# Patient Record
Sex: FEMALE | Race: WHITE | NOT HISPANIC OR LATINO | Employment: PART TIME | URBAN - METROPOLITAN AREA
[De-identification: names, ages, dates, MRNs, and addresses within clinical notes are randomized per-mention and may not be internally consistent; named-entity substitution may affect disease eponyms.]

---

## 2019-07-31 ENCOUNTER — TELEPHONE (OUTPATIENT)
Dept: FAMILY MEDICINE CLINIC | Facility: CLINIC | Age: 24
End: 2019-07-31

## 2019-08-01 DIAGNOSIS — F90.9 ATTENTION DEFICIT HYPERACTIVITY DISORDER (ADHD), UNSPECIFIED ADHD TYPE: Primary | ICD-10-CM

## 2019-08-01 PROBLEM — F90.0 ADHD (ATTENTION DEFICIT HYPERACTIVITY DISORDER), INATTENTIVE TYPE: Status: ACTIVE | Noted: 2019-08-01

## 2019-08-25 PROBLEM — Z79.899 HIGH RISK MEDICATION USE: Status: ACTIVE | Noted: 2019-08-25

## 2019-08-25 PROBLEM — F32.A DEPRESSION: Status: ACTIVE | Noted: 2019-08-25

## 2019-08-27 PROBLEM — J30.1 ACUTE SEASONAL ALLERGIC RHINITIS DUE TO POLLEN: Status: ACTIVE | Noted: 2019-08-27

## 2019-08-27 PROBLEM — D50.9 IRON DEFICIENCY ANEMIA: Status: ACTIVE | Noted: 2019-08-27

## 2019-08-27 PROBLEM — B02.9 HERPES ZOSTER: Status: ACTIVE | Noted: 2019-08-27

## 2019-08-27 PROBLEM — F41.9 ANXIETY: Status: ACTIVE | Noted: 2019-08-27

## 2019-08-30 ENCOUNTER — OFFICE VISIT (OUTPATIENT)
Dept: FAMILY MEDICINE CLINIC | Facility: CLINIC | Age: 24
End: 2019-08-30

## 2019-08-30 VITALS
SYSTOLIC BLOOD PRESSURE: 126 MMHG | WEIGHT: 180 LBS | DIASTOLIC BLOOD PRESSURE: 72 MMHG | OXYGEN SATURATION: 98 % | HEART RATE: 81 BPM | BODY MASS INDEX: 26.66 KG/M2 | TEMPERATURE: 98.7 F | HEIGHT: 69 IN

## 2019-08-30 DIAGNOSIS — F90.9 ATTENTION DEFICIT HYPERACTIVITY DISORDER (ADHD), UNSPECIFIED ADHD TYPE: ICD-10-CM

## 2019-08-30 DIAGNOSIS — Z79.899 HIGH RISK MEDICATION USE: ICD-10-CM

## 2019-08-30 DIAGNOSIS — F90.0 ADHD (ATTENTION DEFICIT HYPERACTIVITY DISORDER), INATTENTIVE TYPE: Primary | ICD-10-CM

## 2019-08-30 PROBLEM — K59.09 OTHER CONSTIPATION: Status: ACTIVE | Noted: 2019-08-30

## 2019-08-30 PROCEDURE — 99213 OFFICE O/P EST LOW 20 MIN: CPT | Performed by: FAMILY MEDICINE

## 2019-08-30 NOTE — PROGRESS NOTES
Chief Complaint   Patient presents with   • Med Refill     Pt is here to refill her ADHD medication    • Constipation     C/o constipation and painful BM's        Subjective   Nati Morin is a 24 y.o. female.     History of Present Illness   PT is here to get reestablished and needs refills and   Patient is here for ADHD checkup needing refills has no chest pains, no palpitations no change in appetite.  And patient is staying focused with medication.  Constipation but laxative and flax seed oil doesn't work well for her.      The following portions of the patient's history were reviewed and updated as appropriate: allergies, current medications, past family history, past medical history, past social history, past surgical history and problem list.    Review of Systems    Patient Active Problem List   Diagnosis   • ADHD (attention deficit hyperactivity disorder), inattentive type   • High risk medication use   • Depression   • Herpes zoster   • Anxiety   • Iron deficiency anemia   • Acute seasonal allergic rhinitis due to pollen       No Known Allergies      Current Outpatient Medications:   •  lisdexamfetamine (VYVANSE) 70 MG capsule, Take 1 capsule by mouth Every Morning, Disp: 30 capsule, Rfl: 0  •  lisdexamfetamine (VYVANSE) 70 MG capsule, Take 70 mg by mouth Daily, Disp: , Rfl:     Past Medical History:   Diagnosis Date   • ADHD (attention deficit hyperactivity disorder), combined type    • Allergic rhinitis    • Anemia    • Anxiety    • Constipation    • Depression    • Hematuria    • Herpes zoster    • High risk medication use    • Menstrual period late    • Situational anxiety        History reviewed. No pertinent surgical history.    Family History   Problem Relation Age of Onset   • Depression Mother    • Alcohol abuse Father        Social History     Tobacco Use   • Smoking status: Never Smoker   • Smokeless tobacco: Never Used   Substance Use Topics   • Alcohol use: Yes     Comment: social use     "        Objective     Visit Vitals  /82   Pulse 81   Temp 98.7 °F (37.1 °C)   Ht 175.3 cm (69\")   Wt 81.6 kg (180 lb)   SpO2 98%   BMI 26.58 kg/m²       Physical Exam    Lab Results   Component Value Date    GLUCOSE 127 (H) 08/21/2016    BUN 13 08/21/2016    CREATININE 0.73 08/21/2016    EGFRIFNONA 100 08/21/2016    BCR 17.8 08/21/2016    K 4.0 08/21/2016    CO2 19.1 (L) 08/21/2016    CALCIUM 9.2 08/21/2016    ALBUMIN 4.90 08/21/2016    AST 18 08/21/2016    ALT 14 08/21/2016       WBC   Date Value Ref Range Status   08/21/2016 5.63 4.50 - 10.70 10*3/mm3 Final     RBC   Date Value Ref Range Status   08/21/2016 3.97 3.90 - 5.20 10*6/mm3 Final     Hemoglobin   Date Value Ref Range Status   08/21/2016 12.1 11.9 - 15.5 g/dL Final     Hematocrit   Date Value Ref Range Status   08/21/2016 36.0 35.6 - 45.5 % Final     MCV   Date Value Ref Range Status   08/21/2016 90.7 80.5 - 98.2 fL Final     MCH   Date Value Ref Range Status   08/21/2016 30.5 26.9 - 32.7 pg Final     MCHC   Date Value Ref Range Status   08/21/2016 33.6 32.4 - 36.3 g/dL Final     RDW   Date Value Ref Range Status   08/21/2016 12.9 11.7 - 13.0 % Final     RDW-SD   Date Value Ref Range Status   08/21/2016 42.9 37.0 - 54.0 fl Final     MPV   Date Value Ref Range Status   08/21/2016 10.9 6.0 - 12.0 fL Final     Platelets   Date Value Ref Range Status   08/21/2016 198 140 - 500 10*3/mm3 Final     Neutrophil %   Date Value Ref Range Status   08/21/2016 54.4 42.7 - 76.0 % Final     Lymphocyte %   Date Value Ref Range Status   08/21/2016 38.0 19.6 - 45.3 % Final     Monocyte %   Date Value Ref Range Status   08/21/2016 5.2 5.0 - 12.0 % Final     Eosinophil %   Date Value Ref Range Status   08/21/2016 1.6 0.3 - 6.2 % Final     Basophil %   Date Value Ref Range Status   08/21/2016 0.4 0.0 - 1.5 % Final     Immature Grans %   Date Value Ref Range Status   08/21/2016 0.4 0.0 - 0.5 % Final     Neutrophils, Absolute   Date Value Ref Range Status   08/21/2016 3.07 " 1.90 - 8.10 10*3/mm3 Final     Lymphocytes, Absolute   Date Value Ref Range Status   08/21/2016 2.14 0.90 - 4.80 10*3/mm3 Final     Monocytes, Absolute   Date Value Ref Range Status   08/21/2016 0.29 0.20 - 1.20 10*3/mm3 Final     Eosinophils, Absolute   Date Value Ref Range Status   08/21/2016 0.09 0.00 - 0.70 10*3/mm3 Final     Basophils, Absolute   Date Value Ref Range Status   08/21/2016 0.02 0.00 - 0.20 10*3/mm3 Final     Immature Grans, Absolute   Date Value Ref Range Status   08/21/2016 0.02 0.00 - 0.03 10*3/mm3 Final       No results found for: HGBA1C    No results found for: NPIORBSV20    No results found for: TSH    No results found for: CHOL  No results found for: TRIG  No results found for: HDL  No results found for: LDL  No results found for: VLDL  No results found for: LDLHDL      Procedures    Assessment/Plan   Problems Addressed this Visit        Unprioritized    ADHD (attention deficit hyperactivity disorder), inattentive type - Primary    Relevant Medications    lisdexamfetamine (VYVANSE) 70 MG capsule    High risk medication use    Relevant Orders    Compliance Drug Analysis, Ur - Urine, Clean Catch          Orders Placed This Encounter   Procedures   • Compliance Drug Analysis, Ur - Urine, Clean Catch       Current Outpatient Medications   Medication Sig Dispense Refill   • lisdexamfetamine (VYVANSE) 70 MG capsule Take 1 capsule by mouth Every Morning 30 capsule 0   • lisdexamfetamine (VYVANSE) 70 MG capsule Take 70 mg by mouth Daily       No current facility-administered medications for this visit.        SHI RUN  and reviewed.  Risks of the medication include but are not limited to fatigue, somnolence, increased risk of falls, constipation, allergic reaction, dependence, and addiction.  Also, emphasized not taking any illicit substances.  Patient is aware and acknowledges information given.      Drug screen done today and Controlled Substance agreement signed.  Refills today.  No Follow-up  on file.  linzess 72 mg samples given  There are no Patient Instructions on file for this visit.

## 2019-09-05 LAB — DRUGS UR: NORMAL

## 2019-09-11 ENCOUNTER — TELEPHONE (OUTPATIENT)
Dept: FAMILY MEDICINE CLINIC | Facility: CLINIC | Age: 24
End: 2019-09-11

## 2019-09-11 NOTE — TELEPHONE ENCOUNTER
Patient says the samples of stomach medicine she got last week helped her. She doesn't know what the name is. Wants it called in to [facility]

## 2019-09-12 DIAGNOSIS — K59.09 OTHER CONSTIPATION: Primary | ICD-10-CM

## 2019-10-01 ENCOUNTER — TELEPHONE (OUTPATIENT)
Dept: FAMILY MEDICINE CLINIC | Facility: CLINIC | Age: 24
End: 2019-10-01

## 2019-10-03 DIAGNOSIS — F90.9 ATTENTION DEFICIT HYPERACTIVITY DISORDER (ADHD), UNSPECIFIED ADHD TYPE: ICD-10-CM

## 2019-10-30 ENCOUNTER — TELEPHONE (OUTPATIENT)
Dept: FAMILY MEDICINE CLINIC | Facility: CLINIC | Age: 24
End: 2019-10-30

## 2019-10-31 DIAGNOSIS — F90.9 ATTENTION DEFICIT HYPERACTIVITY DISORDER (ADHD), UNSPECIFIED ADHD TYPE: ICD-10-CM

## 2019-11-25 ENCOUNTER — TELEPHONE (OUTPATIENT)
Dept: FAMILY MEDICINE CLINIC | Facility: CLINIC | Age: 24
End: 2019-11-25

## 2019-11-25 DIAGNOSIS — F90.9 ATTENTION DEFICIT HYPERACTIVITY DISORDER (ADHD), UNSPECIFIED ADHD TYPE: ICD-10-CM

## 2019-11-27 NOTE — TELEPHONE ENCOUNTER
Patient called about prescription.  Advised it had been sent in on the 25th.  States she will call the pharmacy.

## 2020-01-10 ENCOUNTER — OFFICE VISIT (OUTPATIENT)
Dept: FAMILY MEDICINE CLINIC | Facility: CLINIC | Age: 25
End: 2020-01-10

## 2020-01-10 VITALS
HEIGHT: 69 IN | WEIGHT: 170.8 LBS | DIASTOLIC BLOOD PRESSURE: 64 MMHG | BODY MASS INDEX: 25.3 KG/M2 | OXYGEN SATURATION: 98 % | SYSTOLIC BLOOD PRESSURE: 112 MMHG | TEMPERATURE: 98.2 F | HEART RATE: 88 BPM

## 2020-01-10 DIAGNOSIS — F90.9 ATTENTION DEFICIT HYPERACTIVITY DISORDER (ADHD), UNSPECIFIED ADHD TYPE: ICD-10-CM

## 2020-01-10 DIAGNOSIS — K59.09 OTHER CONSTIPATION: ICD-10-CM

## 2020-01-10 PROCEDURE — 99213 OFFICE O/P EST LOW 20 MIN: CPT | Performed by: FAMILY MEDICINE

## 2020-01-10 NOTE — PROGRESS NOTES
Chief Complaint   Patient presents with   • Med Refill     Pt is here for refills on ADHD meds       Subjective   Nati Morin is a 24 y.o. female.     History of Present Illness   Pt is here for refills and  Patient is here for ADHD checkup needing refills has no chest pains, no palpitations no change in appetite.  And patient is staying focused with medication.  Constipation- stable with meds.  Her insurance has lapsed so will get new insurance in March.    I filled out paperwork for her to get meds discounted or free.      The following portions of the patient's history were reviewed and updated as appropriate: allergies, current medications, past family history, past medical history, past social history, past surgical history and problem list.    Review of Systems   Constitutional: Negative for fatigue.   Eyes: Negative for blurred vision.   Respiratory: Negative for cough, chest tightness and shortness of breath.    Cardiovascular: Negative for chest pain, palpitations and leg swelling.   Neurological: Negative for dizziness, light-headedness and headache.       Patient Active Problem List   Diagnosis   • ADHD (attention deficit hyperactivity disorder), inattentive type   • High risk medication use   • Herpes zoster   • Iron deficiency anemia   • Acute seasonal allergic rhinitis due to pollen   • Other constipation   • Anxiety and depression       No Known Allergies      Current Outpatient Medications:   •  linaclotide (LINZESS) 72 MCG capsule capsule, Take 1 capsule by mouth Every Morning Before Breakfast., Disp: 30 capsule, Rfl: 5  •  lisdexamfetamine (VYVANSE) 70 MG capsule, Take 1 capsule by mouth Every Morning, Disp: 30 capsule, Rfl: 0    Past Medical History:   Diagnosis Date   • Acute seasonal allergic rhinitis due to pollen    • Anxiety and depression    • Hematuria    • Herpes zoster    • High risk medication use    • Situational anxiety        History reviewed. No pertinent surgical  "history.    Family History   Problem Relation Age of Onset   • Depression Mother    • Alcohol abuse Father        Social History     Tobacco Use   • Smoking status: Never Smoker   • Smokeless tobacco: Never Used   Substance Use Topics   • Alcohol use: Yes     Comment: social use            Objective     Visit Vitals  /64   Pulse 88   Temp 98.2 °F (36.8 °C)   Ht 175.3 cm (69\")   Wt 77.5 kg (170 lb 12.8 oz)   SpO2 98%   BMI 25.22 kg/m²       Physical Exam   Nursing note and vitals reviewed.      Lab Results   Component Value Date    GLUCOSE 127 (H) 08/21/2016    BUN 13 08/21/2016    CREATININE 0.73 08/21/2016    EGFRIFNONA 100 08/21/2016    BCR 17.8 08/21/2016    K 4.0 08/21/2016    CO2 19.1 (L) 08/21/2016    CALCIUM 9.2 08/21/2016    ALBUMIN 4.90 08/21/2016    AST 18 08/21/2016    ALT 14 08/21/2016       WBC   Date Value Ref Range Status   08/21/2016 5.63 4.50 - 10.70 10*3/mm3 Final     RBC   Date Value Ref Range Status   08/21/2016 3.97 3.90 - 5.20 10*6/mm3 Final     Hemoglobin   Date Value Ref Range Status   08/21/2016 12.1 11.9 - 15.5 g/dL Final     Hematocrit   Date Value Ref Range Status   08/21/2016 36.0 35.6 - 45.5 % Final     MCV   Date Value Ref Range Status   08/21/2016 90.7 80.5 - 98.2 fL Final     MCH   Date Value Ref Range Status   08/21/2016 30.5 26.9 - 32.7 pg Final     MCHC   Date Value Ref Range Status   08/21/2016 33.6 32.4 - 36.3 g/dL Final     RDW   Date Value Ref Range Status   08/21/2016 12.9 11.7 - 13.0 % Final     RDW-SD   Date Value Ref Range Status   08/21/2016 42.9 37.0 - 54.0 fl Final     MPV   Date Value Ref Range Status   08/21/2016 10.9 6.0 - 12.0 fL Final     Platelets   Date Value Ref Range Status   08/21/2016 198 140 - 500 10*3/mm3 Final     Neutrophil %   Date Value Ref Range Status   08/21/2016 54.4 42.7 - 76.0 % Final     Lymphocyte %   Date Value Ref Range Status   08/21/2016 38.0 19.6 - 45.3 % Final     Monocyte %   Date Value Ref Range Status   08/21/2016 5.2 5.0 - 12.0 % " Final     Eosinophil %   Date Value Ref Range Status   08/21/2016 1.6 0.3 - 6.2 % Final     Basophil %   Date Value Ref Range Status   08/21/2016 0.4 0.0 - 1.5 % Final     Immature Grans %   Date Value Ref Range Status   08/21/2016 0.4 0.0 - 0.5 % Final     Neutrophils, Absolute   Date Value Ref Range Status   08/21/2016 3.07 1.90 - 8.10 10*3/mm3 Final     Lymphocytes, Absolute   Date Value Ref Range Status   08/21/2016 2.14 0.90 - 4.80 10*3/mm3 Final     Monocytes, Absolute   Date Value Ref Range Status   08/21/2016 0.29 0.20 - 1.20 10*3/mm3 Final     Eosinophils, Absolute   Date Value Ref Range Status   08/21/2016 0.09 0.00 - 0.70 10*3/mm3 Final     Basophils, Absolute   Date Value Ref Range Status   08/21/2016 0.02 0.00 - 0.20 10*3/mm3 Final     Immature Grans, Absolute   Date Value Ref Range Status   08/21/2016 0.02 0.00 - 0.03 10*3/mm3 Final       No results found for: HGBA1C    No results found for: [id number]    No results found for: TSH    No results found for: CHOL  No results found for: TRIG  No results found for: HDL  No results found for: LDL  No results found for: VLDL  No results found for: LDLHDL      Procedures    Assessment/Plan   Problems Addressed this Visit        Digestive    Other constipation    Relevant Medications    linaclotide (LINZESS) 72 MCG capsule capsule      Other Visit Diagnoses     Attention deficit hyperactivity disorder (ADHD), unspecified ADHD type        Relevant Medications    lisdexamfetamine (VYVANSE) 70 MG capsule          No orders of the defined types were placed in this encounter.      Current Outpatient Medications   Medication Sig Dispense Refill   • linaclotide (LINZESS) 72 MCG capsule capsule Take 1 capsule by mouth Every Morning Before Breakfast. 30 capsule 5   • lisdexamfetamine (VYVANSE) 70 MG capsule Take 1 capsule by mouth Every Morning 30 capsule 0     No current facility-administered medications for this visit.      Refills and   SHI RUN  and reviewed.   Risks of the medication include but are not limited to fatigue, somnolence, increased risk of falls, constipation, allergic reaction, dependence, and addiction.  Also, emphasized not taking any illicit substances.  Patient is aware and acknowledges information given.        Return in about 3 months (around 4/10/2020) for adhd.    There are no Patient Instructions on file for this visit.

## 2020-02-07 ENCOUNTER — TELEPHONE (OUTPATIENT)
Dept: FAMILY MEDICINE CLINIC | Facility: CLINIC | Age: 25
End: 2020-02-07

## 2020-02-07 DIAGNOSIS — F90.9 ATTENTION DEFICIT HYPERACTIVITY DISORDER (ADHD), UNSPECIFIED ADHD TYPE: ICD-10-CM

## 2020-03-06 DIAGNOSIS — F90.9 ATTENTION DEFICIT HYPERACTIVITY DISORDER (ADHD), UNSPECIFIED ADHD TYPE: ICD-10-CM

## 2020-03-06 NOTE — TELEPHONE ENCOUNTER
Patient is requesting a refill on her vyvanse 70 mg tablets sent to [facility] on file please.  Her phone number [phone].

## 2020-03-31 DIAGNOSIS — K59.09 OTHER CONSTIPATION: ICD-10-CM

## 2020-03-31 DIAGNOSIS — F90.9 ATTENTION DEFICIT HYPERACTIVITY DISORDER (ADHD), UNSPECIFIED ADHD TYPE: ICD-10-CM

## 2020-03-31 NOTE — TELEPHONE ENCOUNTER
Informed patient provider will be back in the office on Thursday to refill medications.  Also confirmed with patient that she is going to set up to do a video or evisit for the ears.

## 2020-03-31 NOTE — TELEPHONE ENCOUNTER
Patient is requesting a refill on her vyvanse 70 mg capsules and linzess  72 mcg please.  Pharmacy is [facility] on file.   Her phone number is [phone].  Also, patient states she is 99 percent sure she has a ear infection and wants to know what you think she should do.  I did mention she could do a video or e visit if she has my chart.  She thinks she has mychart already and is going to look to make sure.

## 2020-05-01 DIAGNOSIS — K59.09 OTHER CONSTIPATION: ICD-10-CM

## 2020-05-01 DIAGNOSIS — F90.9 ATTENTION DEFICIT HYPERACTIVITY DISORDER (ADHD), UNSPECIFIED ADHD TYPE: ICD-10-CM

## 2020-05-04 ENCOUNTER — TELEMEDICINE (OUTPATIENT)
Dept: FAMILY MEDICINE CLINIC | Facility: CLINIC | Age: 25
End: 2020-05-04

## 2020-05-04 DIAGNOSIS — K59.09 OTHER CONSTIPATION: ICD-10-CM

## 2020-05-04 DIAGNOSIS — F90.9 ATTENTION DEFICIT HYPERACTIVITY DISORDER (ADHD), UNSPECIFIED ADHD TYPE: ICD-10-CM

## 2020-05-04 DIAGNOSIS — J30.1 ACUTE SEASONAL ALLERGIC RHINITIS DUE TO POLLEN: ICD-10-CM

## 2020-05-04 DIAGNOSIS — F90.0 ADHD (ATTENTION DEFICIT HYPERACTIVITY DISORDER), INATTENTIVE TYPE: Primary | ICD-10-CM

## 2020-05-04 PROCEDURE — 99213 OFFICE O/P EST LOW 20 MIN: CPT | Performed by: FAMILY MEDICINE

## 2020-05-04 NOTE — PROGRESS NOTES
Chief Complaint   Patient presents with   • ADD       Subjective   Nati Morin is a 25 y.o. female.     History of Present Illness   The patient presents today for follow-up via a video visit due to the COVID-19 pandemic for  Patient is here for ADHD checkup needing refills has no chest pains, no palpitations no change in appetite.  And patient is staying focused with medication.  Constipation stable with meds and   Allergies no complaints and stable.            The following portions of the patient's history were reviewed and updated as appropriate: allergies, current medications, past family history, past medical history, past social history, past surgical history and problem list.    Review of Systems   Constitutional: Negative for fatigue.   Eyes: Negative for blurred vision.   Respiratory: Negative for cough, chest tightness and shortness of breath.    Cardiovascular: Negative for chest pain, palpitations and leg swelling.   Neurological: Negative for dizziness, light-headedness and headache.       Patient Active Problem List   Diagnosis   • ADHD (attention deficit hyperactivity disorder), inattentive type   • High risk medication use   • Herpes zoster   • Iron deficiency anemia   • Acute seasonal allergic rhinitis due to pollen   • Other constipation   • Anxiety and depression       No Known Allergies      Current Outpatient Medications:   •  linaclotide (Linzess) 72 MCG capsule capsule, Take 1 capsule by mouth Every Morning Before Breakfast., Disp: 30 capsule, Rfl: 5  •  lisdexamfetamine (Vyvanse) 70 MG capsule, Take 1 capsule by mouth Every Morning, Disp: 30 capsule, Rfl: 0    Past Medical History:   Diagnosis Date   • Acute seasonal allergic rhinitis due to pollen    • Anxiety and depression    • Hematuria    • Herpes zoster    • High risk medication use    • Situational anxiety        History reviewed. No pertinent surgical history.    Family History   Problem Relation Age of Onset   • Depression  Mother    • Alcohol abuse Father        Social History     Tobacco Use   • Smoking status: Never Smoker   • Smokeless tobacco: Never Used   Substance Use Topics   • Alcohol use: Yes     Comment: social use            Objective     There were no vitals taken for this visit. Video Visit    Physical Exam  NAD  AAOx3  No labored breathing.  EOMI   PERRLA      Lab Results   Component Value Date    GLUCOSE 127 (H) 08/21/2016    BUN 13 08/21/2016    CREATININE 0.73 08/21/2016    EGFRIFNONA 100 08/21/2016    BCR 17.8 08/21/2016    K 4.0 08/21/2016    CO2 19.1 (L) 08/21/2016    CALCIUM 9.2 08/21/2016    ALBUMIN 4.90 08/21/2016    AST 18 08/21/2016    ALT 14 08/21/2016       WBC   Date Value Ref Range Status   08/21/2016 5.63 4.50 - 10.70 10*3/mm3 Final     RBC   Date Value Ref Range Status   08/21/2016 3.97 3.90 - 5.20 10*6/mm3 Final     Hemoglobin   Date Value Ref Range Status   08/21/2016 12.1 11.9 - 15.5 g/dL Final     Hematocrit   Date Value Ref Range Status   08/21/2016 36.0 35.6 - 45.5 % Final     MCV   Date Value Ref Range Status   08/21/2016 90.7 80.5 - 98.2 fL Final     MCH   Date Value Ref Range Status   08/21/2016 30.5 26.9 - 32.7 pg Final     MCHC   Date Value Ref Range Status   08/21/2016 33.6 32.4 - 36.3 g/dL Final     RDW   Date Value Ref Range Status   08/21/2016 12.9 11.7 - 13.0 % Final     RDW-SD   Date Value Ref Range Status   08/21/2016 42.9 37.0 - 54.0 fl Final     MPV   Date Value Ref Range Status   08/21/2016 10.9 6.0 - 12.0 fL Final     Platelets   Date Value Ref Range Status   08/21/2016 198 140 - 500 10*3/mm3 Final     Neutrophil %   Date Value Ref Range Status   08/21/2016 54.4 42.7 - 76.0 % Final     Lymphocyte %   Date Value Ref Range Status   08/21/2016 38.0 19.6 - 45.3 % Final     Monocyte %   Date Value Ref Range Status   08/21/2016 5.2 5.0 - 12.0 % Final     Eosinophil %   Date Value Ref Range Status   08/21/2016 1.6 0.3 - 6.2 % Final     Basophil %   Date Value Ref Range Status   08/21/2016  0.4 0.0 - 1.5 % Final     Immature Grans %   Date Value Ref Range Status   08/21/2016 0.4 0.0 - 0.5 % Final     Neutrophils, Absolute   Date Value Ref Range Status   08/21/2016 3.07 1.90 - 8.10 10*3/mm3 Final     Lymphocytes, Absolute   Date Value Ref Range Status   08/21/2016 2.14 0.90 - 4.80 10*3/mm3 Final     Monocytes, Absolute   Date Value Ref Range Status   08/21/2016 0.29 0.20 - 1.20 10*3/mm3 Final     Eosinophils, Absolute   Date Value Ref Range Status   08/21/2016 0.09 0.00 - 0.70 10*3/mm3 Final     Basophils, Absolute   Date Value Ref Range Status   08/21/2016 0.02 0.00 - 0.20 10*3/mm3 Final     Immature Grans, Absolute   Date Value Ref Range Status   08/21/2016 0.02 0.00 - 0.03 10*3/mm3 Final       No results found for: HGBA1C    No results found for: [id number]    No results found for: TSH    No results found for: CHOL  No results found for: TRIG  No results found for: HDL  No results found for: LDL  No results found for: VLDL  No results found for: LDLHDL      Procedures    Assessment/Plan   Problems Addressed this Visit        Respiratory    Acute seasonal allergic rhinitis due to pollen       Digestive    Other constipation    Relevant Medications    linaclotide (Linzess) 72 MCG capsule capsule       Other    ADHD (attention deficit hyperactivity disorder), inattentive type - Primary    Relevant Medications    lisdexamfetamine (Vyvanse) 70 MG capsule      Other Visit Diagnoses     Attention deficit hyperactivity disorder (ADHD), unspecified ADHD type        Relevant Medications    lisdexamfetamine (Vyvanse) 70 MG capsule          No orders of the defined types were placed in this encounter.      Current Outpatient Medications   Medication Sig Dispense Refill   • linaclotide (Linzess) 72 MCG capsule capsule Take 1 capsule by mouth Every Morning Before Breakfast. 30 capsule 5   • lisdexamfetamine (Vyvanse) 70 MG capsule Take 1 capsule by mouth Every Morning 30 capsule 0     No current  facility-administered medications for this visit.      Refills and  SHI RUN  and reviewed.  Risks of the medication include but are not limited to fatigue, somnolence, increased risk of falls, constipation, allergic reaction, dependence, and addiction.  Also, emphasized not taking any illicit substances.  Patient is aware and acknowledges information given.        Return in about 3 months (around 8/4/2020) for adhd.    Patient Instructions   Follow up in 3 months.           Time spent on visit:  12  minutes.  This patient has consented to a telehealth visit via video. The visit was scheduled as a video visit to comply with patient safety concerns in accordance with CDC recommendations.  All vitals recorded within this visit are reported by the patient.

## 2020-06-01 ENCOUNTER — OFFICE VISIT (OUTPATIENT)
Dept: FAMILY MEDICINE CLINIC | Facility: CLINIC | Age: 25
End: 2020-06-01

## 2020-06-01 VITALS
TEMPERATURE: 98 F | SYSTOLIC BLOOD PRESSURE: 118 MMHG | WEIGHT: 172 LBS | OXYGEN SATURATION: 99 % | DIASTOLIC BLOOD PRESSURE: 70 MMHG | HEART RATE: 75 BPM | BODY MASS INDEX: 25.48 KG/M2 | HEIGHT: 69 IN

## 2020-06-01 DIAGNOSIS — Z30.09 BIRTH CONTROL COUNSELING: ICD-10-CM

## 2020-06-01 DIAGNOSIS — K59.09 OTHER CONSTIPATION: Primary | ICD-10-CM

## 2020-06-01 LAB
B-HCG UR QL: NEGATIVE
INTERNAL NEGATIVE CONTROL: NEGATIVE
INTERNAL POSITIVE CONTROL: NORMAL
Lab: NORMAL

## 2020-06-01 PROCEDURE — 81025 URINE PREGNANCY TEST: CPT | Performed by: FAMILY MEDICINE

## 2020-06-01 PROCEDURE — 99213 OFFICE O/P EST LOW 20 MIN: CPT | Performed by: FAMILY MEDICINE

## 2020-06-01 RX ORDER — NORGESTIMATE AND ETHINYL ESTRADIOL 7DAYSX3 LO
1 KIT ORAL DAILY
Qty: 28 TABLET | Refills: 12 | Status: SHIPPED | OUTPATIENT
Start: 2020-06-01 | End: 2021-06-01

## 2020-06-01 NOTE — PROGRESS NOTES
Chief Complaint   Patient presents with   • Constipation     C/o constipation. Trouble using the bathroom. Taking Linzess, but isn't helping. Having lots of stomach discomfort.    • Contraception     Discuss getting back on birth control pills        Subjective   Nati Morin is a 25 y.o. female.     History of Present Illness   PT is here with issues with constipation and Linzess 72 mg was working well for her but over past few weeks it has stopped working and is using latatives and enema and still issues.  She wants to try to go up on the dosage.  lmp was last week and wants to restart bcp.  2014 pregnancy but not viable. Last pap was last year and was normal.    The following portions of the patient's history were reviewed and updated as appropriate: allergies, current medications, past family history, past medical history, past social history, past surgical history and problem list.    Review of Systems   Constitutional: Negative for fatigue.   Eyes: Negative for blurred vision.   Respiratory: Negative for cough, chest tightness and shortness of breath.    Cardiovascular: Negative for chest pain, palpitations and leg swelling.   Neurological: Negative for dizziness, light-headedness and headache.       Patient Active Problem List   Diagnosis   • ADHD (attention deficit hyperactivity disorder), inattentive type   • High risk medication use   • Herpes zoster   • Iron deficiency anemia   • Acute seasonal allergic rhinitis due to pollen   • Other constipation   • Anxiety and depression   • Birth control counseling       No Known Allergies      Current Outpatient Medications:   •  linaclotide (Linzess) 72 MCG capsule capsule, Take 1 capsule by mouth Every Morning Before Breakfast., Disp: 30 capsule, Rfl: 5  •  lisdexamfetamine (Vyvanse) 70 MG capsule, Take 1 capsule by mouth Every Morning, Disp: 30 capsule, Rfl: 0  •  norgestimate-ethinyl estradiol (Ortho Tri-Cyclen Lo) 0.18/0.215/0.25 MG-25 MCG per tablet, Take 1  "tablet by mouth Daily., Disp: 28 tablet, Rfl: 12    Past Medical History:   Diagnosis Date   • Acute seasonal allergic rhinitis due to pollen    • Anxiety and depression    • Hematuria    • Herpes zoster    • High risk medication use    • Situational anxiety        History reviewed. No pertinent surgical history.    Family History   Problem Relation Age of Onset   • Depression Mother    • Alcohol abuse Father        Social History     Tobacco Use   • Smoking status: Never Smoker   • Smokeless tobacco: Never Used   Substance Use Topics   • Alcohol use: Yes     Comment: social use            Objective     Visit Vitals  /70   Pulse 75   Temp 98 °F (36.7 °C)   Ht 175.3 cm (69\")   Wt 78 kg (172 lb)   SpO2 99%   BMI 25.40 kg/m²       Physical Exam   Constitutional: She appears well-developed. No distress.   Eyes: Conjunctivae and lids are normal.   Neck: Trachea normal. Carotid bruit is not present. No thyroid mass and no thyromegaly present.   Cardiovascular: Normal rate, regular rhythm and normal heart sounds.   Pulmonary/Chest: Effort normal and breath sounds normal.   Abdominal: Soft. Bowel sounds are normal. She exhibits no distension and no mass. There is no tenderness. There is no guarding.   Lymphadenopathy:     She has no cervical adenopathy.   Neurological: She is alert. She is not disoriented.   Skin: Skin is warm and dry.   Psychiatric: She has a normal mood and affect. Her speech is normal and behavior is normal. She is attentive.   Nursing note and vitals reviewed.      Lab Results   Component Value Date    GLUCOSE 127 (H) 08/21/2016    BUN 13 08/21/2016    CREATININE 0.73 08/21/2016    EGFRIFNONA 100 08/21/2016    BCR 17.8 08/21/2016    K 4.0 08/21/2016    CO2 19.1 (L) 08/21/2016    CALCIUM 9.2 08/21/2016    ALBUMIN 4.90 08/21/2016    AST 18 08/21/2016    ALT 14 08/21/2016       WBC   Date Value Ref Range Status   08/21/2016 5.63 4.50 - 10.70 10*3/mm3 Final     RBC   Date Value Ref Range Status "   08/21/2016 3.97 3.90 - 5.20 10*6/mm3 Final     Hemoglobin   Date Value Ref Range Status   08/21/2016 12.1 11.9 - 15.5 g/dL Final     Hematocrit   Date Value Ref Range Status   08/21/2016 36.0 35.6 - 45.5 % Final     MCV   Date Value Ref Range Status   08/21/2016 90.7 80.5 - 98.2 fL Final     MCH   Date Value Ref Range Status   08/21/2016 30.5 26.9 - 32.7 pg Final     MCHC   Date Value Ref Range Status   08/21/2016 33.6 32.4 - 36.3 g/dL Final     RDW   Date Value Ref Range Status   08/21/2016 12.9 11.7 - 13.0 % Final     RDW-SD   Date Value Ref Range Status   08/21/2016 42.9 37.0 - 54.0 fl Final     MPV   Date Value Ref Range Status   08/21/2016 10.9 6.0 - 12.0 fL Final     Platelets   Date Value Ref Range Status   08/21/2016 198 140 - 500 10*3/mm3 Final     Neutrophil %   Date Value Ref Range Status   08/21/2016 54.4 42.7 - 76.0 % Final     Lymphocyte %   Date Value Ref Range Status   08/21/2016 38.0 19.6 - 45.3 % Final     Monocyte %   Date Value Ref Range Status   08/21/2016 5.2 5.0 - 12.0 % Final     Eosinophil %   Date Value Ref Range Status   08/21/2016 1.6 0.3 - 6.2 % Final     Basophil %   Date Value Ref Range Status   08/21/2016 0.4 0.0 - 1.5 % Final     Immature Grans %   Date Value Ref Range Status   08/21/2016 0.4 0.0 - 0.5 % Final     Neutrophils, Absolute   Date Value Ref Range Status   08/21/2016 3.07 1.90 - 8.10 10*3/mm3 Final     Lymphocytes, Absolute   Date Value Ref Range Status   08/21/2016 2.14 0.90 - 4.80 10*3/mm3 Final     Monocytes, Absolute   Date Value Ref Range Status   08/21/2016 0.29 0.20 - 1.20 10*3/mm3 Final     Eosinophils, Absolute   Date Value Ref Range Status   08/21/2016 0.09 0.00 - 0.70 10*3/mm3 Final     Basophils, Absolute   Date Value Ref Range Status   08/21/2016 0.02 0.00 - 0.20 10*3/mm3 Final     Immature Grans, Absolute   Date Value Ref Range Status   08/21/2016 0.02 0.00 - 0.03 10*3/mm3 Final       No results found for: HGBA1C    No results found for: [id number]    No  results found for: TSH    No results found for: CHOL  No results found for: TRIG  No results found for: HDL  No results found for: LDL  No results found for: VLDL  No results found for: LDLHDL      Procedures    Assessment/Plan   Problems Addressed this Visit        Digestive    Other constipation - Primary       Other    Birth control counseling    Relevant Medications    norgestimate-ethinyl estradiol (Ortho Tri-Cyclen Lo) 0.18/0.215/0.25 MG-25 MCG per tablet    Other Relevant Orders    POC Pregnancy, Urine          Orders Placed This Encounter   Procedures   • POC Pregnancy, Urine       Current Outpatient Medications   Medication Sig Dispense Refill   • linaclotide (Linzess) 72 MCG capsule capsule Take 1 capsule by mouth Every Morning Before Breakfast. 30 capsule 5   • lisdexamfetamine (Vyvanse) 70 MG capsule Take 1 capsule by mouth Every Morning 30 capsule 0   • norgestimate-ethinyl estradiol (Ortho Tri-Cyclen Lo) 0.18/0.215/0.25 MG-25 MCG per tablet Take 1 tablet by mouth Daily. 28 tablet 12     No current facility-administered medications for this visit.      Samples of linzess 145 given to pt and she will let me know if it works and if it does will call in script.    Start bcp again.  SE discussed.    Return in about 3 months (around 9/1/2020).    Patient Instructions   Increase fiber in your diet

## 2020-06-02 DIAGNOSIS — F90.9 ATTENTION DEFICIT HYPERACTIVITY DISORDER (ADHD), UNSPECIFIED ADHD TYPE: ICD-10-CM

## 2020-06-05 DIAGNOSIS — K59.09 OTHER CONSTIPATION: ICD-10-CM

## 2020-06-30 DIAGNOSIS — F90.9 ATTENTION DEFICIT HYPERACTIVITY DISORDER (ADHD), UNSPECIFIED ADHD TYPE: ICD-10-CM

## 2020-06-30 DIAGNOSIS — K59.09 OTHER CONSTIPATION: ICD-10-CM

## 2020-07-29 DIAGNOSIS — K59.09 OTHER CONSTIPATION: ICD-10-CM

## 2020-07-29 DIAGNOSIS — F90.9 ATTENTION DEFICIT HYPERACTIVITY DISORDER (ADHD), UNSPECIFIED ADHD TYPE: ICD-10-CM

## 2020-08-31 DIAGNOSIS — F90.9 ATTENTION DEFICIT HYPERACTIVITY DISORDER (ADHD), UNSPECIFIED ADHD TYPE: ICD-10-CM

## 2020-08-31 DIAGNOSIS — K59.09 OTHER CONSTIPATION: ICD-10-CM

## 2020-09-01 ENCOUNTER — TELEMEDICINE (OUTPATIENT)
Dept: FAMILY MEDICINE CLINIC | Facility: CLINIC | Age: 25
End: 2020-09-01

## 2020-09-01 DIAGNOSIS — K59.09 OTHER CONSTIPATION: Primary | ICD-10-CM

## 2020-09-01 DIAGNOSIS — F90.9 ATTENTION DEFICIT HYPERACTIVITY DISORDER (ADHD), UNSPECIFIED ADHD TYPE: ICD-10-CM

## 2020-09-01 PROCEDURE — 99213 OFFICE O/P EST LOW 20 MIN: CPT | Performed by: FAMILY MEDICINE

## 2020-09-01 NOTE — PROGRESS NOTES
CC:  ADHD      Subjective   Nati Morin is a 25 y.o. female.     History of Present Illness   The patient presents today for follow-up via a video visit due to the COVID-19 pandemic for  Patient is here for ADHD checkup needing refills has no chest pains, no palpitations no change in appetite.  And patient is staying focused with medication.  Constipation stable with linzess.  She takes for 2 days and then off a day and working well for her.          The following portions of the patient's history were reviewed and updated as appropriate: allergies, current medications, past family history, past medical history, past social history, past surgical history and problem list.    Review of Systems   Constitutional: Negative for appetite change and fatigue.   Respiratory: Negative for chest tightness and shortness of breath.    Cardiovascular: Negative for chest pain and palpitations.   Psychiatric/Behavioral: Negative for agitation and sleep disturbance.       Patient Active Problem List   Diagnosis   • ADHD (attention deficit hyperactivity disorder), inattentive type   • High risk medication use   • Herpes zoster   • Iron deficiency anemia   • Acute seasonal allergic rhinitis due to pollen   • Other constipation   • Anxiety and depression   • Birth control counseling       No Known Allergies      Current Outpatient Medications:   •  linaclotide (LINZESS) 145 MCG capsule capsule, Take 1 capsule by mouth Every Morning Before Breakfast., Disp: 30 capsule, Rfl: 5  •  lisdexamfetamine (Vyvanse) 70 MG capsule, Take 1 capsule by mouth Every Morning, Disp: 30 capsule, Rfl: 0  •  norgestimate-ethinyl estradiol (Ortho Tri-Cyclen Lo) 0.18/0.215/0.25 MG-25 MCG per tablet, Take 1 tablet by mouth Daily., Disp: 28 tablet, Rfl: 12    Past Medical History:   Diagnosis Date   • Acute seasonal allergic rhinitis due to pollen    • Anxiety and depression    • Hematuria    • Herpes zoster    • High risk medication use    • Situational  anxiety        History reviewed. No pertinent surgical history.    Family History   Problem Relation Age of Onset   • Depression Mother    • Alcohol abuse Father        Social History     Tobacco Use   • Smoking status: Never Smoker   • Smokeless tobacco: Never Used   Substance Use Topics   • Alcohol use: Yes     Comment: social use            Objective     There were no vitals taken for this visit. Video Visit    Physical Exam  NAD  AAOx3  No labored breathing.  EOMI   PERRLA      Lab Results   Component Value Date    GLUCOSE 127 (H) 08/21/2016    BUN 13 08/21/2016    CREATININE 0.73 08/21/2016    EGFRIFNONA 100 08/21/2016    BCR 17.8 08/21/2016    K 4.0 08/21/2016    CO2 19.1 (L) 08/21/2016    CALCIUM 9.2 08/21/2016    ALBUMIN 4.90 08/21/2016    AST 18 08/21/2016    ALT 14 08/21/2016       WBC   Date Value Ref Range Status   08/21/2016 5.63 4.50 - 10.70 10*3/mm3 Final     RBC   Date Value Ref Range Status   08/21/2016 3.97 3.90 - 5.20 10*6/mm3 Final     Hemoglobin   Date Value Ref Range Status   08/21/2016 12.1 11.9 - 15.5 g/dL Final     Hematocrit   Date Value Ref Range Status   08/21/2016 36.0 35.6 - 45.5 % Final     MCV   Date Value Ref Range Status   08/21/2016 90.7 80.5 - 98.2 fL Final     MCH   Date Value Ref Range Status   08/21/2016 30.5 26.9 - 32.7 pg Final     MCHC   Date Value Ref Range Status   08/21/2016 33.6 32.4 - 36.3 g/dL Final     RDW   Date Value Ref Range Status   08/21/2016 12.9 11.7 - 13.0 % Final     RDW-SD   Date Value Ref Range Status   08/21/2016 42.9 37.0 - 54.0 fl Final     MPV   Date Value Ref Range Status   08/21/2016 10.9 6.0 - 12.0 fL Final     Platelets   Date Value Ref Range Status   08/21/2016 198 140 - 500 10*3/mm3 Final     Neutrophil %   Date Value Ref Range Status   08/21/2016 54.4 42.7 - 76.0 % Final     Lymphocyte %   Date Value Ref Range Status   08/21/2016 38.0 19.6 - 45.3 % Final     Monocyte %   Date Value Ref Range Status   08/21/2016 5.2 5.0 - 12.0 % Final      Eosinophil %   Date Value Ref Range Status   08/21/2016 1.6 0.3 - 6.2 % Final     Basophil %   Date Value Ref Range Status   08/21/2016 0.4 0.0 - 1.5 % Final     Immature Grans %   Date Value Ref Range Status   08/21/2016 0.4 0.0 - 0.5 % Final     Neutrophils, Absolute   Date Value Ref Range Status   08/21/2016 3.07 1.90 - 8.10 10*3/mm3 Final     Lymphocytes, Absolute   Date Value Ref Range Status   08/21/2016 2.14 0.90 - 4.80 10*3/mm3 Final     Monocytes, Absolute   Date Value Ref Range Status   08/21/2016 0.29 0.20 - 1.20 10*3/mm3 Final     Eosinophils, Absolute   Date Value Ref Range Status   08/21/2016 0.09 0.00 - 0.70 10*3/mm3 Final     Basophils, Absolute   Date Value Ref Range Status   08/21/2016 0.02 0.00 - 0.20 10*3/mm3 Final     Immature Grans, Absolute   Date Value Ref Range Status   08/21/2016 0.02 0.00 - 0.03 10*3/mm3 Final       No results found for: HGBA1C    No results found for: [id number]    No results found for: TSH    No results found for: CHOL  No results found for: TRIG  No results found for: HDL  No results found for: LDL  No results found for: VLDL  No results found for: LDLHDL      Procedures    Assessment/Plan   Problems Addressed this Visit        Digestive    Other constipation - Primary      Other Visit Diagnoses     Attention deficit hyperactivity disorder (ADHD), unspecified ADHD type        Relevant Medications    lisdexamfetamine (Vyvanse) 70 MG capsule          No orders of the defined types were placed in this encounter.      Current Outpatient Medications   Medication Sig Dispense Refill   • linaclotide (LINZESS) 145 MCG capsule capsule Take 1 capsule by mouth Every Morning Before Breakfast. 30 capsule 5   • lisdexamfetamine (Vyvanse) 70 MG capsule Take 1 capsule by mouth Every Morning 30 capsule 0   • norgestimate-ethinyl estradiol (Ortho Tri-Cyclen Lo) 0.18/0.215/0.25 MG-25 MCG per tablet Take 1 tablet by mouth Daily. 28 tablet 12     No current facility-administered  medications for this visit.        Return in about 3 months (around 12/1/2020) for adhd.    There are no Patient Instructions on file for this visit.     refills and   SHI RUN  and reviewed.  Risks of the medication include but are not limited to fatigue, somnolence, increased risk of falls, constipation, allergic reaction, dependence, and addiction.  Also, emphasized not taking any illicit substances.  Patient is aware and acknowledges information given. Medication refilled.          Time spent on visit:  12   minutes.  This patient has consented to a telehealth visit via video. The visit was scheduled as a video visit to comply with patient safety concerns in accordance with CDC recommendations.  All vitals recorded within this visit are reported by the patient.

## 2020-09-02 ENCOUNTER — TELEPHONE (OUTPATIENT)
Dept: FAMILY MEDICINE CLINIC | Facility: CLINIC | Age: 25
End: 2020-09-02

## 2020-09-02 NOTE — TELEPHONE ENCOUNTER
Pt called asked for us to send in rx to a different pharmacy, pt stated that it will be on back order for a week and is needing medication     lisdexamfetamine (Vyvanse) 70 MG capsule    [facility] [id number] [place] [facility] - [address] - [phone] PH - [phone] (Phone)  [phone] (Fax)

## 2020-09-03 DIAGNOSIS — F90.9 ATTENTION DEFICIT HYPERACTIVITY DISORDER (ADHD), UNSPECIFIED ADHD TYPE: ICD-10-CM

## 2020-10-02 DIAGNOSIS — F90.9 ATTENTION DEFICIT HYPERACTIVITY DISORDER (ADHD), UNSPECIFIED ADHD TYPE: ICD-10-CM

## 2020-10-29 DIAGNOSIS — F90.9 ATTENTION DEFICIT HYPERACTIVITY DISORDER (ADHD), UNSPECIFIED ADHD TYPE: ICD-10-CM

## 2020-11-30 ENCOUNTER — OFFICE VISIT (OUTPATIENT)
Dept: FAMILY MEDICINE CLINIC | Facility: CLINIC | Age: 25
End: 2020-11-30

## 2020-11-30 VITALS
WEIGHT: 176 LBS | HEIGHT: 69 IN | SYSTOLIC BLOOD PRESSURE: 116 MMHG | DIASTOLIC BLOOD PRESSURE: 70 MMHG | OXYGEN SATURATION: 99 % | BODY MASS INDEX: 26.07 KG/M2 | HEART RATE: 83 BPM | TEMPERATURE: 98.2 F

## 2020-11-30 DIAGNOSIS — K59.09 OTHER CONSTIPATION: Primary | ICD-10-CM

## 2020-11-30 DIAGNOSIS — F90.9 ATTENTION DEFICIT HYPERACTIVITY DISORDER (ADHD), UNSPECIFIED ADHD TYPE: ICD-10-CM

## 2020-11-30 PROCEDURE — 99213 OFFICE O/P EST LOW 20 MIN: CPT | Performed by: FAMILY MEDICINE

## 2020-11-30 NOTE — PROGRESS NOTES
Chief Complaint   Patient presents with   • Med Refill     Pt is here to follow up on adhd medication        Subjective   Nati Morin is a 25 y.o. female.     History of Present Illness   Patient is here for ADHD checkup needing refills has no chest pains, no palpitations no change in appetite.  And patient is staying focused with medication.  Constipation- uses linzess prn and does well with it.    The following portions of the patient's history were reviewed and updated as appropriate: allergies, current medications, past family history, past medical history, past social history, past surgical history and problem list.    Review of Systems   Constitutional: Negative for appetite change and fatigue.   Respiratory: Negative for chest tightness and shortness of breath.    Cardiovascular: Negative for chest pain and palpitations.   Psychiatric/Behavioral: Negative for agitation and sleep disturbance.       Patient Active Problem List   Diagnosis   • ADHD (attention deficit hyperactivity disorder), inattentive type   • High risk medication use   • Herpes zoster   • Iron deficiency anemia   • Acute seasonal allergic rhinitis due to pollen   • Other constipation   • Anxiety and depression   • Birth control counseling       No Known Allergies      Current Outpatient Medications:   •  linaclotide (LINZESS) 145 MCG capsule capsule, Take 1 capsule by mouth Every Morning Before Breakfast., Disp: 30 capsule, Rfl: 5  •  lisdexamfetamine (Vyvanse) 70 MG capsule, Take 1 capsule by mouth Every Morning, Disp: 30 capsule, Rfl: 0  •  norgestimate-ethinyl estradiol (Ortho Tri-Cyclen Lo) 0.18/0.215/0.25 MG-25 MCG per tablet, Take 1 tablet by mouth Daily., Disp: 28 tablet, Rfl: 12    Past Medical History:   Diagnosis Date   • Acute seasonal allergic rhinitis due to pollen    • Anxiety and depression    • Hematuria    • Herpes zoster    • High risk medication use    • Situational anxiety        History reviewed. No pertinent surgical  "history.    Family History   Problem Relation Age of Onset   • Depression Mother    • Alcohol abuse Father        Social History     Tobacco Use   • Smoking status: Never Smoker   • Smokeless tobacco: Never Used   Substance Use Topics   • Alcohol use: Yes     Comment: social use            Objective     Visit Vitals  /70   Pulse 83   Temp 98.2 °F (36.8 °C)   Ht 175.3 cm (69\")   Wt 79.8 kg (176 lb)   SpO2 99%   BMI 25.99 kg/m²       Physical Exam  Vitals signs and nursing note reviewed.   Cardiovascular:      Rate and Rhythm: Normal rate and regular rhythm.      Heart sounds: Normal heart sounds. No murmur.   Pulmonary:      Effort: Pulmonary effort is normal. No respiratory distress.      Breath sounds: Normal breath sounds. No stridor.         Lab Results   Component Value Date    GLUCOSE 127 (H) 08/21/2016    BUN 13 08/21/2016    CREATININE 0.73 08/21/2016    EGFRIFNONA 100 08/21/2016    BCR 17.8 08/21/2016    K 4.0 08/21/2016    CO2 19.1 (L) 08/21/2016    CALCIUM 9.2 08/21/2016    ALBUMIN 4.90 08/21/2016    AST 18 08/21/2016    ALT 14 08/21/2016       WBC   Date Value Ref Range Status   08/21/2016 5.63 4.50 - 10.70 10*3/mm3 Final     RBC   Date Value Ref Range Status   08/21/2016 3.97 3.90 - 5.20 10*6/mm3 Final     Hemoglobin   Date Value Ref Range Status   08/21/2016 12.1 11.9 - 15.5 g/dL Final     Hematocrit   Date Value Ref Range Status   08/21/2016 36.0 35.6 - 45.5 % Final     MCV   Date Value Ref Range Status   08/21/2016 90.7 80.5 - 98.2 fL Final     MCH   Date Value Ref Range Status   08/21/2016 30.5 26.9 - 32.7 pg Final     MCHC   Date Value Ref Range Status   08/21/2016 33.6 32.4 - 36.3 g/dL Final     RDW   Date Value Ref Range Status   08/21/2016 12.9 11.7 - 13.0 % Final     RDW-SD   Date Value Ref Range Status   08/21/2016 42.9 37.0 - 54.0 fl Final     MPV   Date Value Ref Range Status   08/21/2016 10.9 6.0 - 12.0 fL Final     Platelets   Date Value Ref Range Status   08/21/2016 198 140 - 500 " 10*3/mm3 Final     Neutrophil %   Date Value Ref Range Status   08/21/2016 54.4 42.7 - 76.0 % Final     Lymphocyte %   Date Value Ref Range Status   08/21/2016 38.0 19.6 - 45.3 % Final     Monocyte %   Date Value Ref Range Status   08/21/2016 5.2 5.0 - 12.0 % Final     Eosinophil %   Date Value Ref Range Status   08/21/2016 1.6 0.3 - 6.2 % Final     Basophil %   Date Value Ref Range Status   08/21/2016 0.4 0.0 - 1.5 % Final     Immature Grans %   Date Value Ref Range Status   08/21/2016 0.4 0.0 - 0.5 % Final     Neutrophils, Absolute   Date Value Ref Range Status   08/21/2016 3.07 1.90 - 8.10 10*3/mm3 Final     Lymphocytes, Absolute   Date Value Ref Range Status   08/21/2016 2.14 0.90 - 4.80 10*3/mm3 Final     Monocytes, Absolute   Date Value Ref Range Status   08/21/2016 0.29 0.20 - 1.20 10*3/mm3 Final     Eosinophils, Absolute   Date Value Ref Range Status   08/21/2016 0.09 0.00 - 0.70 10*3/mm3 Final     Basophils, Absolute   Date Value Ref Range Status   08/21/2016 0.02 0.00 - 0.20 10*3/mm3 Final     Immature Grans, Absolute   Date Value Ref Range Status   08/21/2016 0.02 0.00 - 0.03 10*3/mm3 Final       No results found for: HGBA1C    No results found for: [id number]    No results found for: TSH    No results found for: CHOL  No results found for: TRIG  No results found for: HDL  No results found for: LDL  No results found for: VLDL  No results found for: LDLHDL      Procedures    Assessment/Plan   Problems Addressed this Visit        Digestive    Other constipation - Primary      Other Visit Diagnoses     Attention deficit hyperactivity disorder (ADHD), unspecified ADHD type        Relevant Medications    lisdexamfetamine (Vyvanse) 70 MG capsule      Diagnoses       Codes Comments    Other constipation    -  Primary ICD-10-CM: K59.09  ICD-9-CM: 564.09     Attention deficit hyperactivity disorder (ADHD), unspecified ADHD type     ICD-10-CM: F90.9  ICD-9-CM: 314.01           No orders of the defined types were  placed in this encounter.      Current Outpatient Medications   Medication Sig Dispense Refill   • linaclotide (LINZESS) 145 MCG capsule capsule Take 1 capsule by mouth Every Morning Before Breakfast. 30 capsule 5   • lisdexamfetamine (Vyvanse) 70 MG capsule Take 1 capsule by mouth Every Morning 30 capsule 0   • norgestimate-ethinyl estradiol (Ortho Tri-Cyclen Lo) 0.18/0.215/0.25 MG-25 MCG per tablet Take 1 tablet by mouth Daily. 28 tablet 12     No current facility-administered medications for this visit.        Return in about 3 months (around 2/28/2021) for adhd.    There are no Patient Instructions on file for this visit.    SHI RUN  and reviewed on Epic.  Risks of the medication include but are not limited to fatigue, somnolence, increased risk of falls, constipation, allergic reaction, dependence, and addiction.  Also, emphasized not taking any illicit substances.  Patient is aware and acknowledges information given. Medication refilled.

## 2021-01-02 DIAGNOSIS — F90.9 ATTENTION DEFICIT HYPERACTIVITY DISORDER (ADHD), UNSPECIFIED ADHD TYPE: ICD-10-CM

## 2021-01-28 DIAGNOSIS — F90.9 ATTENTION DEFICIT HYPERACTIVITY DISORDER (ADHD), UNSPECIFIED ADHD TYPE: ICD-10-CM

## 2021-03-05 ENCOUNTER — OFFICE VISIT (OUTPATIENT)
Dept: FAMILY MEDICINE CLINIC | Facility: CLINIC | Age: 26
End: 2021-03-05

## 2021-03-05 VITALS
OXYGEN SATURATION: 98 % | HEART RATE: 70 BPM | HEIGHT: 69 IN | TEMPERATURE: 98.5 F | WEIGHT: 178 LBS | DIASTOLIC BLOOD PRESSURE: 80 MMHG | BODY MASS INDEX: 26.36 KG/M2 | SYSTOLIC BLOOD PRESSURE: 122 MMHG

## 2021-03-05 DIAGNOSIS — K59.09 OTHER CONSTIPATION: ICD-10-CM

## 2021-03-05 DIAGNOSIS — F90.9 ATTENTION DEFICIT HYPERACTIVITY DISORDER (ADHD), UNSPECIFIED ADHD TYPE: ICD-10-CM

## 2021-03-05 DIAGNOSIS — F90.0 ADHD (ATTENTION DEFICIT HYPERACTIVITY DISORDER), INATTENTIVE TYPE: Primary | ICD-10-CM

## 2021-03-05 PROCEDURE — 99213 OFFICE O/P EST LOW 20 MIN: CPT | Performed by: FAMILY MEDICINE

## 2021-03-05 NOTE — PROGRESS NOTES
"Chief Complaint  Med Refill (Pt is here for refills on her adhd mediction )    Subjective          Nati Morin presents to [facility]  History of Present Illness   Patient is here for ADHD checkup needing refills has no chest pains, no palpitations no change in appetite.  And patient is staying focused with medication.  Constipation- stable with med.         Objective   Vital Signs:   /80   Pulse 70   Temp 98.5 °F (36.9 °C)   Ht 175.3 cm (69\")   Wt 80.7 kg (178 lb)   SpO2 98%   BMI 26.29 kg/m²     Physical Exam  Vitals signs and nursing note reviewed.   Constitutional:       Appearance: Normal appearance. She is well-developed.   Cardiovascular:      Rate and Rhythm: Normal rate and regular rhythm.      Heart sounds: Normal heart sounds.   Pulmonary:      Effort: No respiratory distress.   Neurological:      Mental Status: She is alert.        Result Review :                 Assessment and Plan    Diagnoses and all orders for this visit:    1. ADHD (attention deficit hyperactivity disorder), inattentive type (Primary)    2. Attention deficit hyperactivity disorder (ADHD), unspecified ADHD type  -     lisdexamfetamine (Vyvanse) 70 MG capsule; Take 1 capsule by mouth Every Morning  Dispense: 30 capsule; Refill: 0    3. Other constipation        Follow Up   Return in about 3 months (around 6/5/2021) for adhd.  Patient was given instructions and counseling regarding her condition or for health maintenance advice. Please see specific information pulled into the AVS if appropriate.       SHI RUN  and reviewed on Epic.  Risks of the medication include but are not limited to fatigue, somnolence, increased risk of falls, constipation, allergic reaction, dependence, and addiction.  Also, emphasized not taking any illicit substances.  Patient is aware and acknowledges information given. Medication refilled.          "

## 2021-04-04 DIAGNOSIS — F90.9 ATTENTION DEFICIT HYPERACTIVITY DISORDER (ADHD), UNSPECIFIED ADHD TYPE: ICD-10-CM

## 2021-04-16 ENCOUNTER — BULK ORDERING (OUTPATIENT)
Dept: CASE MANAGEMENT | Facility: OTHER | Age: 26
End: 2021-04-16

## 2021-04-16 DIAGNOSIS — Z23 IMMUNIZATION DUE: ICD-10-CM

## 2021-04-20 ENCOUNTER — TELEMEDICINE (OUTPATIENT)
Dept: FAMILY MEDICINE CLINIC | Facility: CLINIC | Age: 26
End: 2021-04-20

## 2021-04-20 DIAGNOSIS — J30.1 ACUTE SEASONAL ALLERGIC RHINITIS DUE TO POLLEN: ICD-10-CM

## 2021-04-20 DIAGNOSIS — J01.00 ACUTE NON-RECURRENT MAXILLARY SINUSITIS: Primary | ICD-10-CM

## 2021-04-20 PROCEDURE — 99213 OFFICE O/P EST LOW 20 MIN: CPT | Performed by: FAMILY MEDICINE

## 2021-04-20 RX ORDER — AMOXICILLIN 875 MG/1
875 TABLET, COATED ORAL 2 TIMES DAILY
Qty: 20 TABLET | Refills: 0 | Status: SHIPPED | OUTPATIENT
Start: 2021-04-20 | End: 2021-08-10

## 2021-04-20 NOTE — PROGRESS NOTES
CC: Sinus congestion    Subjective   Nati Morin is a 26 y.o. female.     History of Present Illness   The patient presents today for follow-up via a video visit due to the COVID-19 pandemic for  6 day hx of sinus congestion and cough and colored phlegm.  She has been taking otc cold med but not really helping.  She had her second covid vaccine about a week ago and had some fatigue the next day.  She has no fever or chills.  No loss of taste or smell.  No covid contact.  No other complaints.            The following portions of the patient's history were reviewed and updated as appropriate: allergies, current medications, past family history, past medical history, past social history, past surgical history and problem list.    Review of Systems    Patient Active Problem List   Diagnosis   • ADHD (attention deficit hyperactivity disorder), inattentive type   • High risk medication use   • Herpes zoster   • Iron deficiency anemia   • Acute seasonal allergic rhinitis due to pollen   • Other constipation   • Anxiety and depression   • Birth control counseling   • Acute non-recurrent maxillary sinusitis       No Known Allergies      Current Outpatient Medications:   •  amoxicillin (AMOXIL) 875 MG tablet, Take 1 tablet by mouth 2 (Two) Times a Day., Disp: 20 tablet, Rfl: 0  •  linaclotide (LINZESS) 145 MCG capsule capsule, Take 1 capsule by mouth Every Morning Before Breakfast., Disp: 30 capsule, Rfl: 5  •  lisdexamfetamine (Vyvanse) 70 MG capsule, Take 1 capsule by mouth Every Morning, Disp: 30 capsule, Rfl: 0  •  norgestimate-ethinyl estradiol (Ortho Tri-Cyclen Lo) 0.18/0.215/0.25 MG-25 MCG per tablet, Take 1 tablet by mouth Daily., Disp: 28 tablet, Rfl: 12    Past Medical History:   Diagnosis Date   • Acute seasonal allergic rhinitis due to pollen    • Anxiety and depression    • Hematuria    • Herpes zoster    • High risk medication use        History reviewed. No pertinent surgical history.    Family History    Problem Relation Age of Onset   • Depression Mother    • Alcohol abuse Father        Social History     Tobacco Use   • Smoking status: Never Smoker   • Smokeless tobacco: Never Used   Substance Use Topics   • Alcohol use: Yes     Comment: social use            Objective     There were no vitals taken for this visit. Video Visit    Physical Exam  NAD  AAOx3  No labored breathing.    Lab Results   Component Value Date    GLUCOSE 127 (H) 08/21/2016    BUN 13 08/21/2016    CREATININE 0.73 08/21/2016    EGFRIFNONA 100 08/21/2016    BCR 17.8 08/21/2016    K 4.0 08/21/2016    CO2 19.1 (L) 08/21/2016    CALCIUM 9.2 08/21/2016    ALBUMIN 4.90 08/21/2016    AST 18 08/21/2016    ALT 14 08/21/2016       WBC   Date Value Ref Range Status   08/21/2016 5.63 4.50 - 10.70 10*3/mm3 Final     RBC   Date Value Ref Range Status   08/21/2016 3.97 3.90 - 5.20 10*6/mm3 Final     Hemoglobin   Date Value Ref Range Status   08/21/2016 12.1 11.9 - 15.5 g/dL Final     Hematocrit   Date Value Ref Range Status   08/21/2016 36.0 35.6 - 45.5 % Final     MCV   Date Value Ref Range Status   08/21/2016 90.7 80.5 - 98.2 fL Final     MCH   Date Value Ref Range Status   08/21/2016 30.5 26.9 - 32.7 pg Final     MCHC   Date Value Ref Range Status   08/21/2016 33.6 32.4 - 36.3 g/dL Final     RDW   Date Value Ref Range Status   08/21/2016 12.9 11.7 - 13.0 % Final     RDW-SD   Date Value Ref Range Status   08/21/2016 42.9 37.0 - 54.0 fl Final     MPV   Date Value Ref Range Status   08/21/2016 10.9 6.0 - 12.0 fL Final     Platelets   Date Value Ref Range Status   08/21/2016 198 140 - 500 10*3/mm3 Final     Neutrophil %   Date Value Ref Range Status   08/21/2016 54.4 42.7 - 76.0 % Final     Lymphocyte %   Date Value Ref Range Status   08/21/2016 38.0 19.6 - 45.3 % Final     Monocyte %   Date Value Ref Range Status   08/21/2016 5.2 5.0 - 12.0 % Final     Eosinophil %   Date Value Ref Range Status   08/21/2016 1.6 0.3 - 6.2 % Final     Basophil %   Date Value  Ref Range Status   08/21/2016 0.4 0.0 - 1.5 % Final     Immature Grans %   Date Value Ref Range Status   08/21/2016 0.4 0.0 - 0.5 % Final     Neutrophils, Absolute   Date Value Ref Range Status   08/21/2016 3.07 1.90 - 8.10 10*3/mm3 Final     Lymphocytes, Absolute   Date Value Ref Range Status   08/21/2016 2.14 0.90 - 4.80 10*3/mm3 Final     Monocytes, Absolute   Date Value Ref Range Status   08/21/2016 0.29 0.20 - 1.20 10*3/mm3 Final     Eosinophils, Absolute   Date Value Ref Range Status   08/21/2016 0.09 0.00 - 0.70 10*3/mm3 Final     Basophils, Absolute   Date Value Ref Range Status   08/21/2016 0.02 0.00 - 0.20 10*3/mm3 Final     Immature Grans, Absolute   Date Value Ref Range Status   08/21/2016 0.02 0.00 - 0.03 10*3/mm3 Final       No results found for: HGBA1C    No results found for: CMSSTSJS19    No results found for: TSH    No results found for: CHOL  No results found for: TRIG  No results found for: HDL  No results found for: LDL  No results found for: VLDL  No results found for: LDLHDL      Procedures    Assessment/Plan   Problems Addressed this Visit     Acute seasonal allergic rhinitis due to pollen    Acute non-recurrent maxillary sinusitis - Primary    Relevant Medications    amoxicillin (AMOXIL) 875 MG tablet      Diagnoses       Codes Comments    Acute non-recurrent maxillary sinusitis    -  Primary ICD-10-CM: J01.00  ICD-9-CM: 461.0     Acute seasonal allergic rhinitis due to pollen     ICD-10-CM: J30.1  ICD-9-CM: 477.0           No orders of the defined types were placed in this encounter.      Current Outpatient Medications   Medication Sig Dispense Refill   • amoxicillin (AMOXIL) 875 MG tablet Take 1 tablet by mouth 2 (Two) Times a Day. 20 tablet 0   • linaclotide (LINZESS) 145 MCG capsule capsule Take 1 capsule by mouth Every Morning Before Breakfast. 30 capsule 5   • lisdexamfetamine (Vyvanse) 70 MG capsule Take 1 capsule by mouth Every Morning 30 capsule 0   • norgestimate-ethinyl estradiol  (Ortho Tri-Cyclen Lo) 0.18/0.215/0.25 MG-25 MCG per tablet Take 1 tablet by mouth Daily. 28 tablet 12     No current facility-administered medications for this visit.       No follow-ups on file.    There are no Patient Instructions on file for this visit.         Time spent on visit:  15   minutes.  This patient has consented to a telehealth visit via video. The visit was scheduled as a video visit to comply with patient safety concerns in accordance with CDC recommendations.  All vitals recorded within this visit are reported by the patient.      Symptomatic treatment and otc meds and fluids and rest.  Follow up if no better.    Start abx and ER warning signs discussed.  Also advised to take a antihistamine for allergies.

## 2021-05-05 DIAGNOSIS — F90.9 ATTENTION DEFICIT HYPERACTIVITY DISORDER (ADHD), UNSPECIFIED ADHD TYPE: ICD-10-CM

## 2021-05-05 NOTE — TELEPHONE ENCOUNTER
Caller: Nati Morin    Relationship: Self    Best call back number: [phone]    Medication needed:   Requested Prescriptions     Pending Prescriptions Disp Refills   • lisdexamfetamine (Vyvanse) 70 MG capsule 30 capsule 0     Sig: Take 1 capsule by mouth Every Morning       When do you need the refill by: 05/05/2021    What additional details did the patient provide when requesting the medication: THE PATIENT IS ON HER LAST TABLET. SHE WILL BE LEAVING OUT OF TOWN TOMORROW AND WILL BE GONE FOR 10 DAYS. SHE WILL NEED THIS REFILL ASAP.     Does the patient have less than a 3 day supply:  [x] Yes  [] No    What is the patient's preferred pharmacy: [facility]/PHARMACY #[id number] [place], [place] - [address] [address] AT [facility] [phone] [facility] [phone]

## 2021-06-01 DIAGNOSIS — F90.9 ATTENTION DEFICIT HYPERACTIVITY DISORDER (ADHD), UNSPECIFIED ADHD TYPE: ICD-10-CM

## 2021-07-01 DIAGNOSIS — F90.9 ATTENTION DEFICIT HYPERACTIVITY DISORDER (ADHD), UNSPECIFIED ADHD TYPE: ICD-10-CM

## 2021-07-07 ENCOUNTER — TELEPHONE (OUTPATIENT)
Dept: FAMILY MEDICINE CLINIC | Facility: CLINIC | Age: 26
End: 2021-07-07

## 2021-07-07 NOTE — TELEPHONE ENCOUNTER
PATIENT STATES SHE JUST PICKED UP HER REFILL OF lisdexamfetamine (Vyvanse) 70 MG capsule.. HOWEVER SHE HAS LOST THE BOTTLE SOMEHOW OR IT WAS STOLEN. PATIENT REALLY NEEDS THIS MED.. IS THERE ANYWAY WE CAN FILL IT AGAIN, WILL INSURANCE COVER MED AGAIN?? IS THERE SOMEWAY AROUND GETTING THIS COVERED AGAIN??? PLEASE CALL PATIENT AND DISCUSS.    PATIENT CAN BE REACHED AT:[phone] [facility] IF NEEDED

## 2021-07-11 DIAGNOSIS — K59.09 OTHER CONSTIPATION: ICD-10-CM

## 2021-07-12 RX ORDER — LINACLOTIDE 145 UG/1
CAPSULE, GELATIN COATED ORAL
Qty: 30 CAPSULE | Refills: 5 | Status: SHIPPED | OUTPATIENT
Start: 2021-07-12 | End: 2021-10-29 | Stop reason: SDUPTHER

## 2021-08-04 DIAGNOSIS — F90.9 ATTENTION DEFICIT HYPERACTIVITY DISORDER (ADHD), UNSPECIFIED ADHD TYPE: ICD-10-CM

## 2021-08-10 ENCOUNTER — OFFICE VISIT (OUTPATIENT)
Dept: FAMILY MEDICINE CLINIC | Facility: CLINIC | Age: 26
End: 2021-08-10

## 2021-08-10 VITALS
TEMPERATURE: 98 F | SYSTOLIC BLOOD PRESSURE: 130 MMHG | DIASTOLIC BLOOD PRESSURE: 70 MMHG | HEART RATE: 72 BPM | OXYGEN SATURATION: 98 % | WEIGHT: 187 LBS | BODY MASS INDEX: 27.7 KG/M2 | HEIGHT: 69 IN

## 2021-08-10 DIAGNOSIS — F90.0 ADHD (ATTENTION DEFICIT HYPERACTIVITY DISORDER), INATTENTIVE TYPE: Primary | ICD-10-CM

## 2021-08-10 DIAGNOSIS — K59.09 OTHER CONSTIPATION: ICD-10-CM

## 2021-08-10 DIAGNOSIS — F90.9 ATTENTION DEFICIT HYPERACTIVITY DISORDER (ADHD), UNSPECIFIED ADHD TYPE: ICD-10-CM

## 2021-08-10 PROCEDURE — 99214 OFFICE O/P EST MOD 30 MIN: CPT | Performed by: FAMILY MEDICINE

## 2021-08-10 NOTE — PROGRESS NOTES
"Chief Complaint  ADD    Subjective          Nati Morin presents to [facility]  History of Present Illness  PT is here for refills and   ADHD- no CP, HA and is staying focused with the medication.    Constipation- she uses linzess occasionally.  She is trying to hydrate more to help also.    Objective   Vital Signs:   /70   Pulse 72   Temp 98 °F (36.7 °C) (Infrared)   Ht 175.3 cm (69\")   Wt 84.8 kg (187 lb)   SpO2 98%   BMI 27.62 kg/m²     Physical Exam  Vitals and nursing note reviewed.   Constitutional:       Appearance: Normal appearance.   Cardiovascular:      Rate and Rhythm: Normal rate and regular rhythm.      Heart sounds: Normal heart sounds. No murmur heard.     Pulmonary:      Effort: Pulmonary effort is normal. No respiratory distress.      Breath sounds: No wheezing.   Neurological:      Mental Status: She is alert and oriented to person, place, and time.   Psychiatric:         Mood and Affect: Mood normal.        Result Review :                 Assessment and Plan    Diagnoses and all orders for this visit:    1. ADHD (attention deficit hyperactivity disorder), inattentive type (Primary)    2. Attention deficit hyperactivity disorder (ADHD), unspecified ADHD type  -     lisdexamfetamine (Vyvanse) 70 MG capsule; Take 1 capsule by mouth Every Morning  Dispense: 30 capsule; Refill: 0    3. Other constipation        Follow Up   Return in about 3 months (around 11/10/2021) for adhd.  Patient was given instructions and counseling regarding her condition or for health maintenance advice. Please see specific information pulled into the AVS if appropriate.       SHI RUN  and reviewed on Epic.  Risks of the medication include but are not limited to fatigue, somnolence, increased risk of falls, constipation, allergic reaction, dependence, and addiction.  Also, emphasized not taking any illicit substances.  Patient is aware and acknowledges information given. " Medication refilled.

## 2021-09-24 DIAGNOSIS — F90.9 ATTENTION DEFICIT HYPERACTIVITY DISORDER (ADHD), UNSPECIFIED ADHD TYPE: ICD-10-CM

## 2021-09-24 NOTE — TELEPHONE ENCOUNTER
Rx Refill Note  Requested Prescriptions     Pending Prescriptions Disp Refills   • lisdexamfetamine (Vyvanse) 70 MG capsule 30 capsule 0     Sig: Take 1 capsule by mouth Every Morning      Last office visit with prescribing clinician: 8/10/2021      Next office visit with prescribing clinician: Visit date not found            Irving Yuen MA  09/24/21, 09:07 EDT

## 2021-10-26 DIAGNOSIS — F90.9 ATTENTION DEFICIT HYPERACTIVITY DISORDER (ADHD), UNSPECIFIED ADHD TYPE: ICD-10-CM

## 2021-10-26 NOTE — TELEPHONE ENCOUNTER
Rx Refill Note  Requested Prescriptions     Pending Prescriptions Disp Refills   • lisdexamfetamine (Vyvanse) 70 MG capsule 30 capsule 0     Sig: Take 1 capsule by mouth Every Morning      Last office visit with prescribing clinician: 8/10/21      Next office visit with prescribing clinician: Visit date not found            Bren Klein MA  10/26/21, 16:21 EDT

## 2021-10-29 DIAGNOSIS — K59.09 OTHER CONSTIPATION: ICD-10-CM

## 2021-10-29 NOTE — TELEPHONE ENCOUNTER
Rx Refill Note  Requested Prescriptions     Pending Prescriptions Disp Refills   • linaclotide (Linzess) 145 MCG capsule capsule 30 capsule 5     Sig: Take 1 capsule by mouth Every Morning Before Breakfast.      Last office visit with prescribing clinician: 8/10/2021      Next office visit with prescribing clinician: Visit date not found            Irving Yuen MA  10/29/21, 07:06 EDT

## 2021-11-09 DIAGNOSIS — K59.09 OTHER CONSTIPATION: ICD-10-CM

## 2021-11-09 NOTE — TELEPHONE ENCOUNTER
Rx Refill Note  Requested Prescriptions     Pending Prescriptions Disp Refills   • linaclotide (Linzess) 145 MCG capsule capsule 30 capsule 5     Sig: Take 1 capsule by mouth Every Morning Before Breakfast.      Last office visit with prescribing clinician: 8/10/2021      Next office visit with prescribing clinician: Visit date not found            Irving Yuen MA  11/09/21, 14:29 EST

## 2021-11-23 DIAGNOSIS — F90.9 ATTENTION DEFICIT HYPERACTIVITY DISORDER (ADHD), UNSPECIFIED ADHD TYPE: ICD-10-CM

## 2021-11-23 NOTE — TELEPHONE ENCOUNTER
Rx Refill Note  Requested Prescriptions     Pending Prescriptions Disp Refills   • lisdexamfetamine (Vyvanse) 70 MG capsule 30 capsule 0     Sig: Take 1 capsule by mouth Every Morning      Last office visit with prescribing clinician: 8/10/2021      Next office visit with prescribing clinician:Past due     Dian Kovacs MA  11/23/21, 12:58 EST     Left message to return call.  Need appt for any refills    HUB MAY RELAY MESSAGE TO PATIENT AND RESPOND.    PLEASE REPLY THAT PT HAS BEEN NOTIFIED.

## 2021-11-29 DIAGNOSIS — F90.9 ATTENTION DEFICIT HYPERACTIVITY DISORDER (ADHD), UNSPECIFIED ADHD TYPE: ICD-10-CM

## 2021-11-29 NOTE — TELEPHONE ENCOUNTER
Rx Refill Note  Requested Prescriptions     Pending Prescriptions Disp Refills   • lisdexamfetamine (Vyvanse) 70 MG capsule 30 capsule 0     Sig: Take 1 capsule by mouth Every Morning      Last office visit with prescribing clinician: 8/10/2021      Next office visit with prescribing clinician: Visit date not found            Irving Yuen MA  11/29/21, 14:18 EST

## 2021-11-29 NOTE — TELEPHONE ENCOUNTER
Rx Refill Note  Requested Prescriptions     Pending Prescriptions Disp Refills   • lisdexamfetamine (Vyvanse) 70 MG capsule 30 capsule 0     Sig: Take 1 capsule by mouth Every Morning      Last office visit with prescribing clinician: 8/10/2021      Next office visit with prescribing clinician: 12/6/2021            Irving Yuen MA  11/29/21, 16:18 EST

## 2021-11-29 NOTE — TELEPHONE ENCOUNTER
Medication requested (name and dose): lisdexamfetamine (Vyvanse) 70 MG capsule    Pharmacy where request should be sent: [id number] [place] [facility] - [address] - [phone]  - [phone] FX     Additional details provided by patient: PT HAS MADE AN APPT FOR 12/06 WHICH IS THE SOONEST AVAILABILITY AND WOULD LIKE TO ASK MD BAIN FOR REFILL UNTIL THEN. SHE ONLY HAS ONE PILL LEFT FOR TOMORROW     Best call back number: [phone]    Does the patient have less than a 3 day supply:  [x] Yes  [] No    Leighann Marvin  11/29/21, 16:10 EST

## 2021-12-06 ENCOUNTER — OFFICE VISIT (OUTPATIENT)
Dept: FAMILY MEDICINE CLINIC | Facility: CLINIC | Age: 26
End: 2021-12-06

## 2021-12-06 VITALS
SYSTOLIC BLOOD PRESSURE: 118 MMHG | OXYGEN SATURATION: 98 % | HEIGHT: 69 IN | HEART RATE: 85 BPM | DIASTOLIC BLOOD PRESSURE: 74 MMHG | TEMPERATURE: 98.4 F | WEIGHT: 184.6 LBS | RESPIRATION RATE: 16 BRPM | BODY MASS INDEX: 27.34 KG/M2

## 2021-12-06 DIAGNOSIS — K59.09 OTHER CONSTIPATION: ICD-10-CM

## 2021-12-06 DIAGNOSIS — F90.9 ATTENTION DEFICIT HYPERACTIVITY DISORDER (ADHD), UNSPECIFIED ADHD TYPE: ICD-10-CM

## 2021-12-06 DIAGNOSIS — G44.89 OTHER HEADACHE SYNDROME: Primary | ICD-10-CM

## 2021-12-06 DIAGNOSIS — F90.0 ADHD (ATTENTION DEFICIT HYPERACTIVITY DISORDER), INATTENTIVE TYPE: ICD-10-CM

## 2021-12-06 PROCEDURE — 99214 OFFICE O/P EST MOD 30 MIN: CPT | Performed by: FAMILY MEDICINE

## 2021-12-06 NOTE — PROGRESS NOTES
"Chief Complaint  ADHD    Subjective          Nati Morin presents to [facility]  History of Present Illness  PT is here for refills and  ADHD- no CP and she is staying focused.  No   Constipation- stable with med and no abd pains.    She has occasionally been having migraines on and off for about a year but over past 6 weeks started to make her vision blurry.  Headaches are on left side.  She has no NV, or light or sound sensitivity.   Her last one was about 2 weeks ago.    Objective   Vital Signs:   /74 (BP Location: Right arm, Patient Position: Sitting, Cuff Size: Large Adult)   Pulse 85   Temp 98.4 °F (36.9 °C) (Temporal)   Resp 16   Ht 175.3 cm (69\")   Wt 83.7 kg (184 lb 9.6 oz)   SpO2 98%   BMI 27.26 kg/m²     Physical Exam  Vitals and nursing note reviewed.   Constitutional:       Appearance: Normal appearance.   Eyes:      General: No scleral icterus.        Right eye: No discharge.         Left eye: No discharge.      Conjunctiva/sclera: Conjunctivae normal.      Pupils: Pupils are equal, round, and reactive to light.   Cardiovascular:      Rate and Rhythm: Normal rate and regular rhythm.      Heart sounds: No murmur heard.  No friction rub.   Pulmonary:      Effort: Pulmonary effort is normal.      Breath sounds: Normal breath sounds.   Neurological:      General: No focal deficit present.      Mental Status: She is alert. Mental status is at baseline. She is disoriented.   Psychiatric:         Mood and Affect: Mood normal.         Behavior: Behavior normal.        Result Review :                 Assessment and Plan    Diagnoses and all orders for this visit:    1. Other headache syndrome (Primary)  -     MRI Brain With & Without Contrast  -     Ambulatory Referral to Neurology    2. ADHD (attention deficit hyperactivity disorder), inattentive type    3. Other constipation    4. Attention deficit hyperactivity disorder (ADHD), unspecified ADHD type  -     " lisdexamfetamine (Vyvanse) 70 MG capsule; Take 1 capsule by mouth Every Morning  Dispense: 30 capsule; Refill: 0        Follow Up   Return in about 3 months (around 3/6/2022) for adhd.  Patient was given instructions and counseling regarding her condition or for health maintenance advice. Please see specific information pulled into the AVS if appropriate.       SHI RUN  and reviewed on Epic.  Risks of the medication include but are not limited to fatigue, somnolence, increased risk of falls, constipation, allergic reaction, dependence, and addiction.  Also, emphasized not taking any illicit substances.  Patient is aware and acknowledges information given. Medication refilled.

## 2021-12-27 DIAGNOSIS — F90.9 ATTENTION DEFICIT HYPERACTIVITY DISORDER (ADHD), UNSPECIFIED ADHD TYPE: ICD-10-CM

## 2021-12-29 DIAGNOSIS — F90.9 ATTENTION DEFICIT HYPERACTIVITY DISORDER (ADHD), UNSPECIFIED ADHD TYPE: ICD-10-CM

## 2021-12-29 NOTE — TELEPHONE ENCOUNTER
Rx Refill Note  Requested Prescriptions     Pending Prescriptions Disp Refills   • lisdexamfetamine (Vyvanse) 70 MG capsule 30 capsule 0     Sig: Take 1 capsule by mouth Every Morning      Last office visit with prescribing clinician: 12/6/2021      Next office visit with prescribing clinician: Visit date not found            Irving Yuen MA  12/29/21, 10:44 EST

## 2021-12-29 NOTE — TELEPHONE ENCOUNTER
PATIENT NEEDS REFILL ON:lisdexamfetamine (Vyvanse) 70 MG capsule     PATIENT CAN BE REACHED ON: [phone]     PHARMACY PREFERRED:[facility]/pharmacy #[id number] - [place] [id number] [facility] - [phone] [facility] [phone]   [phone]

## 2022-01-14 ENCOUNTER — APPOINTMENT (OUTPATIENT)
Dept: MRI IMAGING | Facility: HOSPITAL | Age: 27
End: 2022-01-14

## 2022-01-26 DIAGNOSIS — F90.9 ATTENTION DEFICIT HYPERACTIVITY DISORDER (ADHD), UNSPECIFIED ADHD TYPE: ICD-10-CM

## 2022-01-27 NOTE — TELEPHONE ENCOUNTER
Rx Refill Note  Requested Prescriptions     Pending Prescriptions Disp Refills   • lisdexamfetamine (Vyvanse) 70 MG capsule 30 capsule 0     Sig: Take 1 capsule by mouth Every Morning      Last office visit with prescribing clinician: 12/6/2021      Next office visit with prescribing clinician: Visit date not found            Irving Yuen MA  01/27/22, 07:28 EST

## 2022-05-10 ENCOUNTER — TELEMEDICINE (OUTPATIENT)
Dept: FAMILY MEDICINE CLINIC | Facility: CLINIC | Age: 27
End: 2022-05-10

## 2022-05-10 DIAGNOSIS — J01.00 ACUTE NON-RECURRENT MAXILLARY SINUSITIS: Primary | ICD-10-CM

## 2022-05-10 PROCEDURE — 99213 OFFICE O/P EST LOW 20 MIN: CPT | Performed by: FAMILY MEDICINE

## 2022-05-10 RX ORDER — AMOXICILLIN 875 MG/1
875 TABLET, COATED ORAL 2 TIMES DAILY
Qty: 20 TABLET | Refills: 0 | Status: SHIPPED | OUTPATIENT
Start: 2022-05-10 | End: 2022-11-03

## 2022-05-10 NOTE — PROGRESS NOTES
CC:  Sinus infection.    Subjective   Nati Morin is a 27 y.o. female.     History of Present Illness   The patient presents today for follow-up via a video visit due to the COVID-19 pandemic for  2-day history of cough, congestion, sore throat, drainage, yellow mucus discharge from nose.  She also has some earaches.  She has no shortness of breath, no fever or chills.  She is not sure what to take because she is 20 weeks pregnant.  She has had a couple of negative COVID test.  No nausea vomiting diarrhea or constipation.  Neg covid test  Ears, yellow mucous, drainage sore throat.   Cough.  No sob.  No fever or chills.          The following portions of the patient's history were reviewed and updated as appropriate: allergies, current medications, past family history, past medical history, past social history, past surgical history and problem list.    Review of Systems    Patient Active Problem List   Diagnosis   • ADHD (attention deficit hyperactivity disorder), inattentive type   • High risk medication use   • Herpes zoster   • Iron deficiency anemia   • Acute seasonal allergic rhinitis due to pollen   • Other constipation   • Anxiety and depression   • Birth control counseling   • Acute non-recurrent maxillary sinusitis   • Other headache syndrome       No Known Allergies      Current Outpatient Medications:   •  amoxicillin (AMOXIL) 875 MG tablet, Take 1 tablet by mouth 2 (Two) Times a Day., Disp: 20 tablet, Rfl: 0  •  linaclotide (Linzess) 145 MCG capsule capsule, Take 1 capsule by mouth Every Morning Before Breakfast., Disp: 30 capsule, Rfl: 5  •  lisdexamfetamine (Vyvanse) 70 MG capsule, Take 1 capsule by mouth Every Morning, Disp: 30 capsule, Rfl: 0    Past Medical History:   Diagnosis Date   • Acute seasonal allergic rhinitis due to pollen    • Anxiety and depression    • Hematuria    • Herpes zoster    • High risk medication use        History reviewed. No pertinent surgical history.    Family  History   Problem Relation Age of Onset   • Depression Mother    • Alcohol abuse Father        Social History     Tobacco Use   • Smoking status: Never Smoker   • Smokeless tobacco: Never Used   Substance Use Topics   • Alcohol use: Yes     Comment: social use            Objective     There were no vitals taken for this visit. Video Visit    Physical Exam  NAD  AAOx3  No labored breathing.      Lab Results   Component Value Date    GLUCOSE 127 (H) 08/21/2016    BUN 13 08/21/2016    CREATININE 0.73 08/21/2016    EGFRIFNONA 100 08/21/2016    BCR 17.8 08/21/2016    K 4.0 08/21/2016    CO2 19.1 (L) 08/21/2016    CALCIUM 9.2 08/21/2016    ALBUMIN 4.90 08/21/2016    AST 18 08/21/2016    ALT 14 08/21/2016       WBC   Date Value Ref Range Status   02/14/2022 9.11 4.5 - 11.0 10*3/uL Final     RBC   Date Value Ref Range Status   02/14/2022 4.09 4.0 - 5.2 10*6/uL Final     Hemoglobin   Date Value Ref Range Status   02/14/2022 12.7 12.0 - 16.0 g/dL Final     Hematocrit   Date Value Ref Range Status   02/14/2022 39.4 36.0 - 46.0 % Final     MCV   Date Value Ref Range Status   02/14/2022 96.3 80.0 - 100.0 fL Final     MCH   Date Value Ref Range Status   02/14/2022 31.1 26.0 - 34.0 pg Final     MCHC   Date Value Ref Range Status   02/14/2022 32.2 31.0 - 37.0 g/dL Final     RDW   Date Value Ref Range Status   02/14/2022 12.8 12.0 - 16.8 % Final     RDW-SD   Date Value Ref Range Status   08/21/2016 42.9 37.0 - 54.0 fl Final     MPV   Date Value Ref Range Status   02/14/2022 12.3 8.4 - 12.4 fL Final     Platelets   Date Value Ref Range Status   02/14/2022 203 140 - 440 10*3/uL Final     Neutrophil Rel %   Date Value Ref Range Status   02/14/2022 77.9 45 - 80 % Final     Lymphocyte Rel %   Date Value Ref Range Status   02/14/2022 15.4 15 - 50 % Final     Monocyte Rel %   Date Value Ref Range Status   02/14/2022 5.4 0 - 15 % Final     Eosinophil %   Date Value Ref Range Status   02/14/2022 0.7 0 - 7 % Final     Basophil Rel %   Date  Value Ref Range Status   02/14/2022 0.2 0 - 2 % Final     Immature Grans %   Date Value Ref Range Status   02/14/2022 0.4 0.0 - 1.0 % Final     Neutrophils Absolute   Date Value Ref Range Status   02/14/2022 7.10 2.0 - 8.8 10*3/uL Final     Lymphocytes Absolute   Date Value Ref Range Status   02/14/2022 1.40 0.7 - 5.5 10*3/uL Final     Monocytes Absolute   Date Value Ref Range Status   02/14/2022 0.49 0.0 - 1.7 10*3/uL Final     Eosinophils Absolute   Date Value Ref Range Status   02/14/2022 0.06 0.0 - 0.8 10*3/uL Final     Basophils Absolute   Date Value Ref Range Status   02/14/2022 0.02 0.0 - 0.2 10*3/uL Final     Immature Grans, Absolute   Date Value Ref Range Status   02/14/2022 0.04 0.00 - 0.10 10*3/uL Final     nRBC   Date Value Ref Range Status   02/14/2022 0 0 /100(WBC) Final           Procedures    [unfilled]  Problems Addressed this Visit     Acute non-recurrent maxillary sinusitis - Primary    Relevant Medications    amoxicillin (AMOXIL) 875 MG tablet      Diagnoses       Codes Comments    Acute non-recurrent maxillary sinusitis    -  Primary ICD-10-CM: J01.00  ICD-9-CM: 461.0           No orders of the defined types were placed in this encounter.      Current Outpatient Medications   Medication Sig Dispense Refill   • amoxicillin (AMOXIL) 875 MG tablet Take 1 tablet by mouth 2 (Two) Times a Day. 20 tablet 0   • linaclotide (Linzess) 145 MCG capsule capsule Take 1 capsule by mouth Every Morning Before Breakfast. 30 capsule 5   • lisdexamfetamine (Vyvanse) 70 MG capsule Take 1 capsule by mouth Every Morning 30 capsule 0     No current facility-administered medications for this visit.       No follow-ups on file.    There are no Patient Instructions on file for this visit.         Time spent on visit:  22   minutes.  This patient has consented to a telehealth visit via video. The visit was scheduled as a video visit to comply with patient safety concerns in accordance with CDC recommendations.   All vitals recorded within this visit are reported by the patient.      Symptomatic treatment and otc meds and fluids and rest.  Follow up if no better.     Start abx.  If develop sob go to er.  Sudafed for symptoms.

## 2022-11-03 ENCOUNTER — OFFICE VISIT (OUTPATIENT)
Dept: FAMILY MEDICINE CLINIC | Facility: CLINIC | Age: 27
End: 2022-11-03

## 2022-11-03 VITALS
TEMPERATURE: 98 F | HEART RATE: 88 BPM | SYSTOLIC BLOOD PRESSURE: 116 MMHG | HEIGHT: 69 IN | DIASTOLIC BLOOD PRESSURE: 70 MMHG | OXYGEN SATURATION: 100 % | RESPIRATION RATE: 16 BRPM | BODY MASS INDEX: 29.36 KG/M2 | WEIGHT: 198.2 LBS

## 2022-11-03 DIAGNOSIS — K59.09 OTHER CONSTIPATION: ICD-10-CM

## 2022-11-03 DIAGNOSIS — F90.9 ATTENTION DEFICIT HYPERACTIVITY DISORDER (ADHD), UNSPECIFIED ADHD TYPE: ICD-10-CM

## 2022-11-03 PROCEDURE — 99213 OFFICE O/P EST LOW 20 MIN: CPT | Performed by: FAMILY MEDICINE

## 2022-11-03 NOTE — PROGRESS NOTES
"Chief Complaint  ADHD    Subjective        Nati Morin presents to [facility]  History of Present Illness  Pt is here for refills and  She has been off vyvanse bc she was pregnant.  She wants to get back on it.  She has no CP or HA.  She has stopped breast feeding.  She wants to start a a lower dosage.    Objective   Vital Signs:  /70 (BP Location: Right arm, Patient Position: Sitting, Cuff Size: Large Adult)   Pulse 88   Temp 98 °F (36.7 °C) (Temporal)   Resp 16   Ht 175.3 cm (69\")   Wt 89.9 kg (198 lb 3.2 oz)   SpO2 100%   BMI 29.27 kg/m²   Estimated body mass index is 29.27 kg/m² as calculated from the following:    Height as of this encounter: 175.3 cm (69\").    Weight as of this encounter: 89.9 kg (198 lb 3.2 oz).          Physical Exam  Vitals and nursing note reviewed.   Constitutional:       Appearance: Normal appearance. She is well-developed.   Cardiovascular:      Rate and Rhythm: Normal rate and regular rhythm.      Heart sounds: Normal heart sounds. No murmur heard.  Pulmonary:      Effort: Pulmonary effort is normal. No respiratory distress.      Breath sounds: Normal breath sounds. No stridor. No wheezing or rhonchi.   Neurological:      General: No focal deficit present.      Mental Status: She is alert and oriented to person, place, and time. She is not disoriented.   Psychiatric:         Mood and Affect: Mood normal.         Behavior: Behavior normal.        Result Review :                Assessment and Plan   Diagnoses and all orders for this visit:    1. Attention deficit hyperactivity disorder (ADHD), unspecified ADHD type  -     lisdexamfetamine (Vyvanse) 40 MG capsule; Take 1 capsule by mouth Every Morning  Dispense: 30 capsule; Refill: 0    2. Other constipation             Follow Up   Return in about 3 months (around 2/3/2023) for adhd.  Patient was given instructions and counseling regarding her condition or for health maintenance advice. Please " see specific information pulled into the AVS if appropriate.       SHI RUN  and reviewed on Epic.  Risks of the medication include but are not limited to fatigue, somnolence, increased risk of falls, constipation, allergic reaction, dependence, and addiction.  Also, emphasized not taking any illicit substances.  Patient is aware and acknowledges information given.  Patient is functioning well with the medication.  Patient denies somnolence or any other side effects with the medication.   Medication refilled.        Refills and labs.

## 2022-12-05 DIAGNOSIS — F90.9 ATTENTION DEFICIT HYPERACTIVITY DISORDER (ADHD), UNSPECIFIED ADHD TYPE: ICD-10-CM

## 2023-01-20 DIAGNOSIS — F90.0 ADHD (ATTENTION DEFICIT HYPERACTIVITY DISORDER), INATTENTIVE TYPE: Primary | ICD-10-CM

## 2023-02-15 DIAGNOSIS — F90.0 ADHD (ATTENTION DEFICIT HYPERACTIVITY DISORDER), INATTENTIVE TYPE: ICD-10-CM

## 2023-02-15 NOTE — TELEPHONE ENCOUNTER
Caller: Nati Morin    Relationship: Self    Best call back number: [phone]    Requested Prescriptions:   Requested Prescriptions     Pending Prescriptions Disp Refills   • lisdexamfetamine (Vyvanse) 50 MG capsule 30 capsule 0     Sig: Take 1 capsule by mouth Every Morning        Pharmacy where request should be sent: [facility]/PHARMACY #[id number] [place], [place] - [address] [place] [address] [phone] [facility] [phone] FX     Additional details provided by patient: PATIENT SCHEDULED FOR 3/3/23 (SOONEST OFFICE IS 3/2/23) PATIENT HAS TO LEAVE OUT OF TOWN TOMORROW FOR A FAMILY EMERGENCY. WOULD LIKE TO PICK THIS UP EARLY.     Does the patient have less than a 3 day supply:  [] Yes  [x] No    Would you like a call back once the refill request has been completed: [x] Yes [] No    If the office needs to give you a call back, can they leave a voicemail: [x] Yes [] No    Jana Kowalski Rep   02/15/23 09:52 EST

## 2023-03-03 ENCOUNTER — TELEMEDICINE (OUTPATIENT)
Dept: FAMILY MEDICINE CLINIC | Facility: CLINIC | Age: 28
End: 2023-03-03
Payer: COMMERCIAL

## 2023-03-03 DIAGNOSIS — F90.0 ADHD (ATTENTION DEFICIT HYPERACTIVITY DISORDER), INATTENTIVE TYPE: Primary | ICD-10-CM

## 2023-03-03 PROCEDURE — 99213 OFFICE O/P EST LOW 20 MIN: CPT | Performed by: FAMILY MEDICINE

## 2023-03-03 NOTE — PROGRESS NOTES
CHIEF COMPLAINT: ADHD    Subjective   Nati Morin is a 27 y.o. female.     History of Present Illness   Patient presents during the Covid-19 pandemic/federally declared [facility] of public health emergency.  This service was conducted via video visit  PT is at home and I am at my desk at my office.    Patient presents today for refills on ADHD medicine.  She is doing relatively well with the medicine.  She may want to increase it next month to get closer to what she used to be on.  She has no chest pains or palpitations.  Staying focused with medication.  No other concerns.    The following portions of the patient's history were reviewed and updated as appropriate: allergies, current medications, past family history, past medical history, past social history, past surgical history and problem list.    Review of Systems    Patient Active Problem List   Diagnosis   • ADHD (attention deficit hyperactivity disorder), inattentive type   • High risk medication use   • Herpes zoster   • Iron deficiency anemia   • Acute seasonal allergic rhinitis due to pollen   • Other constipation   • Anxiety and depression   • Birth control counseling   • Acute non-recurrent maxillary sinusitis   • Other headache syndrome       No Known Allergies      Current Outpatient Medications:   •  lisdexamfetamine (Vyvanse) 50 MG capsule, Take 1 capsule by mouth Every Morning, Disp: 30 capsule, Rfl: 0    Past Medical History:   Diagnosis Date   • Acute seasonal allergic rhinitis due to pollen    • Anxiety and depression    • Hematuria    • Herpes zoster    • High risk medication use        History reviewed. No pertinent surgical history.    Family History   Problem Relation Age of Onset   • Depression Mother    • Alcohol abuse Father        Social History     Tobacco Use   • Smoking status: Never   • Smokeless tobacco: Never   Substance Use Topics   • Alcohol use: Yes     Comment: social use            Objective     There were no vitals taken for  this visit. Video Visit    Physical Exam  NAD  AAOx3  No labored breathing.      Lab Results   Component Value Date    GLUCOSE 127 (H) 08/21/2016    BUN 13 08/21/2016    CREATININE 0.73 08/21/2016    EGFRIFNONA 100 08/21/2016    BCR 17.8 08/21/2016    K 4.0 08/21/2016    CO2 19.1 (L) 08/21/2016    CALCIUM 9.2 08/21/2016    ALBUMIN 4.90 08/21/2016    AST 18 08/21/2016    ALT 14 08/21/2016       WBC   Date Value Ref Range Status   02/14/2022 9.11 4.5 - 11.0 10*3/uL Final     RBC   Date Value Ref Range Status   02/14/2022 4.09 4.0 - 5.2 10*6/uL Final     Hemoglobin   Date Value Ref Range Status   10/06/2022 9.2 (L) 12.0 - 16.0 g/dL Final     Hematocrit   Date Value Ref Range Status   10/06/2022 27.1 (L) 36.0 - 46.0 % Final     MCV   Date Value Ref Range Status   02/14/2022 96.3 80.0 - 100.0 fL Final     MCH   Date Value Ref Range Status   02/14/2022 31.1 26.0 - 34.0 pg Final     MCHC   Date Value Ref Range Status   02/14/2022 32.2 31.0 - 37.0 g/dL Final     RDW   Date Value Ref Range Status   02/14/2022 12.8 12.0 - 16.8 % Final     RDW-SD   Date Value Ref Range Status   08/21/2016 42.9 37.0 - 54.0 fl Final     MPV   Date Value Ref Range Status   02/14/2022 12.3 8.4 - 12.4 fL Final     Platelets   Date Value Ref Range Status   02/14/2022 203 140 - 440 10*3/uL Final     Neutrophil Rel %   Date Value Ref Range Status   02/14/2022 77.9 45 - 80 % Final     Lymphocyte Rel %   Date Value Ref Range Status   02/14/2022 15.4 15 - 50 % Final     Monocyte Rel %   Date Value Ref Range Status   02/14/2022 5.4 0 - 15 % Final     Eosinophil %   Date Value Ref Range Status   02/14/2022 0.7 0 - 7 % Final     Basophil Rel %   Date Value Ref Range Status   02/14/2022 0.2 0 - 2 % Final     Immature Grans %   Date Value Ref Range Status   02/14/2022 0.4 0.0 - 1.0 % Final     Neutrophils Absolute   Date Value Ref Range Status   02/14/2022 7.10 2.0 - 8.8 10*3/uL Final     Lymphocytes Absolute   Date Value Ref Range Status   02/14/2022 1.40  0.7 - 5.5 10*3/uL Final     Monocytes Absolute   Date Value Ref Range Status   02/14/2022 0.49 0.0 - 1.7 10*3/uL Final     Eosinophils Absolute   Date Value Ref Range Status   02/14/2022 0.06 0.0 - 0.8 10*3/uL Final     Basophils Absolute   Date Value Ref Range Status   02/14/2022 0.02 0.0 - 0.2 10*3/uL Final     Immature Grans, Absolute   Date Value Ref Range Status   02/14/2022 0.04 0.00 - 0.10 10*3/uL Final     nRBC   Date Value Ref Range Status   02/14/2022 0 0 /100(WBC) Final       No results found for: HGBA1C    No results found for: [id number]    No results found for: TSH    No results found for: CHOL  No results found for: TRIG  No results found for: HDL  No results found for: LDL  No results found for: VLDL  No results found for: LDLHDL      Procedures    Assessment & Plan   Problems Addressed this Visit     ADHD (attention deficit hyperactivity disorder), inattentive type - Primary   Diagnoses       Codes Comments    ADHD (attention deficit hyperactivity disorder), inattentive type    -  Primary ICD-10-CM: F90.0  ICD-9-CM: 314.00           No orders of the defined types were placed in this encounter.      Current Outpatient Medications   Medication Sig Dispense Refill   • lisdexamfetamine (Vyvanse) 50 MG capsule Take 1 capsule by mouth Every Morning 30 capsule 0     No current facility-administered medications for this visit.       No follow-ups on file.    There are no Patient Instructions on file for this visit.         Time spent on visit:  14   minutes.  This patient has consented to a telehealth visit via video. The visit was scheduled as a video visit to comply with patient safety concerns in accordance with CDC recommendations.  All vitals recorded within this visit are reported by the patient.        SHI RUN  and reviewed on Epic.  Risks of the medication include but are not limited to fatigue, somnolence, increased risk of falls, constipation, allergic reaction, dependence, and addiction.   Also, emphasized not taking any illicit substances.  Patient is aware and acknowledges information given.  Patient is functioning well with the medication.  Patient denies somnolence or any other side effects with the medication.   Medication refilled.      Refills on medication.  Follow-up in 3 months.  May increase dosage next month if need be.

## 2023-03-23 DIAGNOSIS — F90.0 ADHD (ATTENTION DEFICIT HYPERACTIVITY DISORDER), INATTENTIVE TYPE: ICD-10-CM

## 2023-03-24 DIAGNOSIS — F90.0 ADHD (ATTENTION DEFICIT HYPERACTIVITY DISORDER), INATTENTIVE TYPE: Primary | ICD-10-CM

## 2023-04-21 DIAGNOSIS — F90.0 ADHD (ATTENTION DEFICIT HYPERACTIVITY DISORDER), INATTENTIVE TYPE: ICD-10-CM

## 2023-05-23 DIAGNOSIS — F90.0 ADHD (ATTENTION DEFICIT HYPERACTIVITY DISORDER), INATTENTIVE TYPE: ICD-10-CM

## 2023-05-23 RX ORDER — IBUPROFEN 800 MG/1
TABLET ORAL
COMMUNITY
Start: 2023-03-20

## 2023-05-23 RX ORDER — NORETHINDRONE ACETATE AND ETHINYL ESTRADIOL 1; 20 MG/1; UG/1
TABLET ORAL
COMMUNITY
Start: 2023-05-13

## 2023-05-23 RX ORDER — PROMETHAZINE HYDROCHLORIDE 25 MG/1
TABLET ORAL
COMMUNITY
Start: 2023-03-20

## 2023-05-23 NOTE — TELEPHONE ENCOUNTER
LOV               3/3/23   NOV              Due by 6/3/23  Last RF         4/24/23    Sent message to schedule her recheck 3 month appt    Dian Kovacs, COLLIN/LMR

## 2023-05-23 NOTE — TELEPHONE ENCOUNTER
PATIENT CALLED AND SCHEDULED THE SOONEST AVAILABILITY WITH DR. BAIN. PLEASE SEND MEDICATION TO LAST UNTIL SHE IS SEEN.

## 2023-06-12 ENCOUNTER — TELEMEDICINE (OUTPATIENT)
Dept: FAMILY MEDICINE CLINIC | Facility: CLINIC | Age: 28
End: 2023-06-12
Payer: COMMERCIAL

## 2023-06-12 DIAGNOSIS — F90.0 ADHD (ATTENTION DEFICIT HYPERACTIVITY DISORDER), INATTENTIVE TYPE: ICD-10-CM

## 2023-06-12 PROBLEM — J01.00 ACUTE NON-RECURRENT MAXILLARY SINUSITIS: Status: RESOLVED | Noted: 2021-04-20 | Resolved: 2023-06-12

## 2023-06-12 PROCEDURE — 99213 OFFICE O/P EST LOW 20 MIN: CPT | Performed by: FAMILY MEDICINE

## 2023-06-12 NOTE — PROGRESS NOTES
CHIEF COMPLAINT:ADHD    Subjective   Nati Morin is a 28 y.o. female.     History of Present Illness   Patient presents during the Covid-19 pandemic/federally declared [facility] of public health emergency.  This service was conducted via video visit  PT is at home and I am at my desk at my office.    Patient is here for ADHD checkup needing refills has no chest pains, no palpitations no change in appetite.  And patient is staying focused with medication.      The following portions of the patient's history were reviewed and updated as appropriate: allergies, current medications, past family history, past medical history, past social history, past surgical history and problem list.    Review of Systems    Patient Active Problem List   Diagnosis    ADHD (attention deficit hyperactivity disorder), inattentive type    High risk medication use    Herpes zoster    Iron deficiency anemia    Acute seasonal allergic rhinitis due to pollen    Other constipation    Anxiety and depression    Birth control counseling    Other headache syndrome       No Known Allergies      Current Outpatient Medications:     lisdexamfetamine (Vyvanse) 60 MG capsule, Take 1 capsule by mouth Every Morning, Disp: 30 capsule, Rfl: 0    ibuprofen (ADVIL,MOTRIN) 800 MG tablet, TAKE 1 TABLET BY MOUTH EVERY 8 HOURS AS NEEDED FOR MILD PAIN FOR UP TO 10 DAYS, Disp: , Rfl:     Junel 1/20 1-20 MG-MCG per tablet, , Disp: , Rfl:     promethazine (PHENERGAN) 25 MG tablet, TAKE 1 TABLET BY MOUTH EVERY 6 HOURS AS NEEDED FOR NAUSEA AND VOMITING FOR UP TO 3 DAYS, Disp: , Rfl:     Past Medical History:   Diagnosis Date    Acute seasonal allergic rhinitis due to pollen     Anxiety and depression     Hematuria     Herpes zoster     High risk medication use        History reviewed. No pertinent surgical history.    Family History   Problem Relation Age of Onset    Depression Mother     Alcohol abuse Father        Social History     Tobacco Use    Smoking status:  Never    Smokeless tobacco: Never   Substance Use Topics    Alcohol use: Yes     Comment: social use            Objective     There were no vitals taken for this visit. Video Visit    Physical Exam  NAD  AAOx3  No labored breathing.  EOMI   PERRLA      Lab Results   Component Value Date    GLUCOSE 127 (H) 08/21/2016    BUN 13 08/21/2016    CREATININE 0.73 08/21/2016    EGFRIFNONA 100 08/21/2016    BCR 17.8 08/21/2016    K 4.0 08/21/2016    CO2 19.1 (L) 08/21/2016    CALCIUM 9.2 08/21/2016    ALBUMIN 4.90 08/21/2016    AST 18 08/21/2016    ALT 14 08/21/2016       WBC   Date Value Ref Range Status   10/04/2022 12.86 (H) 4.5 - 11.0 10*3/uL Final     RBC   Date Value Ref Range Status   10/04/2022 3.66 (L) 4.0 - 5.2 10*6/uL Final     Hemoglobin   Date Value Ref Range Status   10/06/2022 9.2 (L) 12.0 - 16.0 g/dL Final     Hematocrit   Date Value Ref Range Status   10/06/2022 27.1 (L) 36.0 - 46.0 % Final     MCV   Date Value Ref Range Status   10/04/2022 90.4 80.0 - 100.0 fL Final     MCH   Date Value Ref Range Status   10/04/2022 31.7 26.0 - 34.0 pg Final     MCHC   Date Value Ref Range Status   10/04/2022 35.0 31.0 - 37.0 g/dL Final     RDW   Date Value Ref Range Status   10/04/2022 14.1 12.0 - 16.8 % Final     RDW-SD   Date Value Ref Range Status   08/21/2016 42.9 37.0 - 54.0 fl Final     MPV   Date Value Ref Range Status   10/04/2022 11.9 8.4 - 12.4 fL Final     Platelets   Date Value Ref Range Status   10/04/2022 169 140 - 440 10*3/uL Final     Neutrophil Rel %   Date Value Ref Range Status   10/04/2022 87.7 (H) 45 - 80 % Final     Lymphocyte Rel %   Date Value Ref Range Status   10/04/2022 7.5 (L) 15 - 50 % Final     Monocyte Rel %   Date Value Ref Range Status   10/04/2022 4.0 0 - 15 % Final     Eosinophil %   Date Value Ref Range Status   10/04/2022 0.1 0 - 7 % Final     Basophil Rel %   Date Value Ref Range Status   10/04/2022 0.1 0 - 2 % Final     Immature Grans %   Date Value Ref Range Status   10/04/2022 0.6  0.0 - 1.0 % Final     Neutrophils Absolute   Date Value Ref Range Status   10/04/2022 11.28 (H) 2.0 - 8.8 10*3/uL Final     Lymphocytes Absolute   Date Value Ref Range Status   10/04/2022 0.97 0.7 - 5.5 10*3/uL Final     Monocytes Absolute   Date Value Ref Range Status   10/04/2022 0.51 0.0 - 1.7 10*3/uL Final     Eosinophils Absolute   Date Value Ref Range Status   10/04/2022 0.01 0.0 - 0.8 10*3/uL Final     Basophils Absolute   Date Value Ref Range Status   10/04/2022 0.01 0.0 - 0.2 10*3/uL Final     Immature Grans, Absolute   Date Value Ref Range Status   10/04/2022 0.08 0.00 - 0.10 10*3/uL Final     nRBC   Date Value Ref Range Status   10/04/2022 0 0 /100(WBC) Final       No results found for: HGBA1C    No results found for: [id number]    No results found for: TSH    No results found for: CHOL  No results found for: TRIG  No results found for: HDL  No results found for: LDL  No results found for: VLDL  No results found for: LDLHDL      Procedures    Assessment & Plan   Problems Addressed this Visit       ADHD (attention deficit hyperactivity disorder), inattentive type    Relevant Medications    lisdexamfetamine (Vyvanse) 60 MG capsule     Diagnoses         Codes Comments    ADHD (attention deficit hyperactivity disorder), inattentive type     ICD-10-CM: F90.0  ICD-9-CM: 314.00             No orders of the defined types were placed in this encounter.      Current Outpatient Medications   Medication Sig Dispense Refill    lisdexamfetamine (Vyvanse) 60 MG capsule Take 1 capsule by mouth Every Morning 30 capsule 0    ibuprofen (ADVIL,MOTRIN) 800 MG tablet TAKE 1 TABLET BY MOUTH EVERY 8 HOURS AS NEEDED FOR MILD PAIN FOR UP TO 10 DAYS      Junel 1/20 1-20 MG-MCG per tablet       promethazine (PHENERGAN) 25 MG tablet TAKE 1 TABLET BY MOUTH EVERY 6 HOURS AS NEEDED FOR NAUSEA AND VOMITING FOR UP TO 3 DAYS       No current facility-administered medications for this visit.       Return in about 3 months (around 9/12/2023)  for adhd.    There are no Patient Instructions on file for this visit.         Time spent on visit:  12   minutes.  This patient has consented to a telehealth visit via video. The visit was scheduled as a video visit to comply with patient safety concerns in accordance with CDC recommendations.  All vitals recorded within this visit are reported by the patient.    SHI RUN  and reviewed on Epic.  Risks of the medication include but are not limited to fatigue, somnolence, increased risk of falls, constipation, allergic reaction, dependence, and addiction.  Also, emphasized not taking any illicit substances.  Patient is aware and acknowledges information given.  Patient is functioning well with the medication.  Patient denies somnolence or any other side effects with the medication.   Medication refilled.      Refills today.

## 2023-07-31 DIAGNOSIS — F90.0 ADHD (ATTENTION DEFICIT HYPERACTIVITY DISORDER), INATTENTIVE TYPE: ICD-10-CM

## 2023-08-01 ENCOUNTER — TELEPHONE (OUTPATIENT)
Dept: FAMILY MEDICINE CLINIC | Facility: CLINIC | Age: 28
End: 2023-08-01
Payer: COMMERCIAL

## 2023-08-01 DIAGNOSIS — F90.0 ADHD (ATTENTION DEFICIT HYPERACTIVITY DISORDER), INATTENTIVE TYPE: ICD-10-CM

## 2023-09-12 ENCOUNTER — TELEMEDICINE (OUTPATIENT)
Dept: FAMILY MEDICINE CLINIC | Facility: CLINIC | Age: 28
End: 2023-09-12
Payer: COMMERCIAL

## 2023-09-12 DIAGNOSIS — F90.0 ADHD (ATTENTION DEFICIT HYPERACTIVITY DISORDER), INATTENTIVE TYPE: Primary | ICD-10-CM

## 2023-09-12 PROCEDURE — 99214 OFFICE O/P EST MOD 30 MIN: CPT | Performed by: FAMILY MEDICINE

## 2023-09-12 NOTE — PROGRESS NOTES
CHIEF COMPLAINT:ADHD    Subjective   Nati Morin is a 28 y.o. female.     History of Present Illness   Patient presents during the Covid-19 pandemic/federally declared [facility] of public health emergency.  This service was conducted via video visit  PT is at home and I am at my desk at my office.    Patient is here for ADHD checkup needing refills has no chest pains, no palpitations no change in appetite.  And patient is staying focused with medication.  Patient is wanting to drop down to 50 mg this month and then go back to 60 next month if possible.  Due to distribution issues.      The following portions of the patient's history were reviewed and updated as appropriate: allergies, current medications, past family history, past medical history, past social history, past surgical history and problem list.    Review of Systems    Patient Active Problem List   Diagnosis    ADHD (attention deficit hyperactivity disorder), inattentive type    High risk medication use    Herpes zoster    Iron deficiency anemia    Acute seasonal allergic rhinitis due to pollen    Other constipation    Anxiety and depression    Birth control counseling    Other headache syndrome       No Known Allergies      Current Outpatient Medications:     ibuprofen (ADVIL,MOTRIN) 800 MG tablet, TAKE 1 TABLET BY MOUTH EVERY 8 HOURS AS NEEDED FOR MILD PAIN FOR UP TO 10 DAYS, Disp: , Rfl:     Junel 1/20 1-20 MG-MCG per tablet, , Disp: , Rfl:     lisdexamfetamine (Vyvanse) 50 MG capsule, Take 1 capsule by mouth Every Morning, Disp: 30 capsule, Rfl: 0    lisdexamfetamine (Vyvanse) 60 MG capsule, Take 1 capsule by mouth Every Morning, Disp: 30 capsule, Rfl: 0    promethazine (PHENERGAN) 25 MG tablet, TAKE 1 TABLET BY MOUTH EVERY 6 HOURS AS NEEDED FOR NAUSEA AND VOMITING FOR UP TO 3 DAYS, Disp: , Rfl:     tobramycin (Tobrex) 0.3 % solution ophthalmic solution, Administer 2 drops to both eyes Every 4 (Four) Hours., Disp: 5 mL, Rfl: 0    Past Medical  History:   Diagnosis Date    Acute seasonal allergic rhinitis due to pollen     Anxiety and depression     Hematuria     Herpes zoster     High risk medication use        History reviewed. No pertinent surgical history.    Family History   Problem Relation Age of Onset    Depression Mother     Alcohol abuse Father        Social History     Tobacco Use    Smoking status: Never    Smokeless tobacco: Never   Substance Use Topics    Alcohol use: Yes     Comment: social use            Objective     There were no vitals taken for this visit. Video Visit    Physical Exam  NAD  AAOx3  No labored breathing.  EOMI   PERRLA      Lab Results   Component Value Date    GLUCOSE 127 (H) 08/21/2016    BUN 13 08/21/2016    CREATININE 0.73 08/21/2016    EGFRIFNONA 100 08/21/2016    BCR 17.8 08/21/2016    K 4.0 08/21/2016    CO2 19.1 (L) 08/21/2016    CALCIUM 9.2 08/21/2016    ALBUMIN 4.90 08/21/2016    AST 18 08/21/2016    ALT 14 08/21/2016       WBC   Date Value Ref Range Status   04/20/2023 5.00 4.5 - 11.0 10*3/uL Final     RBC   Date Value Ref Range Status   04/20/2023 3.19 (L) 4.0 - 5.2 10*6/uL Final     Hemoglobin   Date Value Ref Range Status   04/25/2023 8.7 (L) 12.0 - 16.0 g/dL Final     Hematocrit   Date Value Ref Range Status   04/25/2023 28.6 (L) 36.0 - 46.0 % Final     MCV   Date Value Ref Range Status   04/20/2023 92.5 80.0 - 100.0 fL Final     MCH   Date Value Ref Range Status   04/20/2023 29.5 26.0 - 34.0 pg Final     MCHC   Date Value Ref Range Status   04/20/2023 31.9 31.0 - 37.0 g/dL Final     RDW   Date Value Ref Range Status   04/20/2023 12.6 12.0 - 16.8 % Final     RDW-SD   Date Value Ref Range Status   08/21/2016 42.9 37.0 - 54.0 fl Final     MPV   Date Value Ref Range Status   04/20/2023 11.0 8.4 - 12.4 fL Final     Platelets   Date Value Ref Range Status   04/20/2023 240 140 - 440 10*3/uL Final     Neutrophil Rel %   Date Value Ref Range Status   04/20/2023 67.6 45 - 80 % Final     Lymphocyte Rel %   Date Value  Ref Range Status   04/20/2023 26.8 15 - 50 % Final     Monocyte Rel %   Date Value Ref Range Status   04/20/2023 4.8 0 - 15 % Final     Eosinophil %   Date Value Ref Range Status   04/20/2023 0.4 0 - 7 % Final     Basophil Rel %   Date Value Ref Range Status   04/20/2023 0.2 0 - 2 % Final     Immature Grans %   Date Value Ref Range Status   04/20/2023 0.2 0.0 - 1.0 % Final     Neutrophils Absolute   Date Value Ref Range Status   04/20/2023 3.38 2.0 - 8.8 10*3/uL Final     Lymphocytes Absolute   Date Value Ref Range Status   04/20/2023 1.34 0.7 - 5.5 10*3/uL Final     Monocytes Absolute   Date Value Ref Range Status   04/20/2023 0.24 0.0 - 1.7 10*3/uL Final     Eosinophils Absolute   Date Value Ref Range Status   04/20/2023 0.02 0.0 - 0.8 10*3/uL Final     Basophils Absolute   Date Value Ref Range Status   04/20/2023 0.01 0.0 - 0.2 10*3/uL Final     Immature Grans, Absolute   Date Value Ref Range Status   04/20/2023 0.01 0.00 - 0.10 10*3/uL Final     nRBC   Date Value Ref Range Status   04/20/2023 0 0 /100(WBC) Final       No results found for: HGBA1C    No results found for: FUGMISFN62    No results found for: TSH    No results found for: CHOL  No results found for: TRIG  No results found for: HDL  No results found for: LDL  No results found for: VLDL  No results found for: LDLHDL      Procedures    Assessment & Plan   Problems Addressed this Visit       ADHD (attention deficit hyperactivity disorder), inattentive type - Primary    Relevant Medications    lisdexamfetamine (Vyvanse) 50 MG capsule     Diagnoses         Codes Comments    ADHD (attention deficit hyperactivity disorder), inattentive type    -  Primary ICD-10-CM: F90.0  ICD-9-CM: 314.00             No orders of the defined types were placed in this encounter.      Current Outpatient Medications   Medication Sig Dispense Refill    ibuprofen (ADVIL,MOTRIN) 800 MG tablet TAKE 1 TABLET BY MOUTH EVERY 8 HOURS AS NEEDED FOR MILD PAIN FOR UP TO 10 DAYS       Junel 1/20 1-20 MG-MCG per tablet       lisdexamfetamine (Vyvanse) 50 MG capsule Take 1 capsule by mouth Every Morning 30 capsule 0    lisdexamfetamine (Vyvanse) 60 MG capsule Take 1 capsule by mouth Every Morning 30 capsule 0    promethazine (PHENERGAN) 25 MG tablet TAKE 1 TABLET BY MOUTH EVERY 6 HOURS AS NEEDED FOR NAUSEA AND VOMITING FOR UP TO 3 DAYS      tobramycin (Tobrex) 0.3 % solution ophthalmic solution Administer 2 drops to both eyes Every 4 (Four) Hours. 5 mL 0     No current facility-administered medications for this visit.       Return in about 3 months (around 12/12/2023) for adhd.    There are no Patient Instructions on file for this visit.         Time spent on visit:  15   minutes.  This patient has consented to a telehealth visit via video. The visit was scheduled as a video visit to comply with patient safety concerns in accordance with CDC recommendations.  All vitals recorded within this visit are reported by the patient.          SHI RUN  and reviewed on Epic.  Risks of the medication include but are not limited to fatigue, somnolence, increased risk of falls, constipation, allergic reaction, dependence, and addiction.  Also, emphasized not taking any illicit substances.  Patient is aware and acknowledges information given.  Patient is functioning well with the medication.  Patient denies somnolence or any other side effects with the medication.   Medication refilled.    Decrease Vyvanse to 50 mg daily instead of 60 mg.  Side effects discussed.

## 2023-10-12 DIAGNOSIS — F90.0 ADHD (ATTENTION DEFICIT HYPERACTIVITY DISORDER), INATTENTIVE TYPE: ICD-10-CM

## 2023-10-13 RX ORDER — LISDEXAMFETAMINE DIMESYLATE CAPSULES 50 MG/1
50 CAPSULE ORAL EVERY MORNING
Qty: 30 CAPSULE | Refills: 0 | Status: SHIPPED | OUTPATIENT
Start: 2023-10-13

## 2023-10-18 ENCOUNTER — TELEPHONE (OUTPATIENT)
Dept: FAMILY MEDICINE CLINIC | Facility: CLINIC | Age: 28
End: 2023-10-18
Payer: COMMERCIAL

## 2023-10-18 DIAGNOSIS — F90.0 ADHD (ATTENTION DEFICIT HYPERACTIVITY DISORDER), INATTENTIVE TYPE: ICD-10-CM

## 2023-10-18 NOTE — TELEPHONE ENCOUNTER
"Caller: Nati Morin \"Andrea\"    Relationship: Self    Best call back number:     [phone] (Mobile)       Requested Prescriptions:     lisdexamfetamine (Vyvanse) 60 MG capsule          Pharmacy where request should be sent:  [facility] [id number] [address] - [phone]  - [phone] FX     Last office visit with prescribing clinician: 11/3/2022   Last telemedicine visit with prescribing clinician: 9/12/2023   Next office visit with prescribing clinician: 12/5/2023     Additional details provided by patient: PATIENT IS NEEDING THE GENERIC BRAND CALLED INTO HER PHARMACY, THEY HAVE IT IN STOCK FOR THE 60 MG. PATIENT STATES THAT HE IS COMPLETELY OUT MEDICATION.        Does the patient have less than a 3 day supply:  [x] Yes  [] No    Would you like a call back once the refill request has been completed: [] Yes [] No    If the office needs to give you a call back, can they leave a voicemail: [] Yes [] No    Jana Draper Rep   10/18/23 12:44 EDT         THANKS     "

## 2023-10-19 DIAGNOSIS — F90.0 ADHD (ATTENTION DEFICIT HYPERACTIVITY DISORDER), INATTENTIVE TYPE: ICD-10-CM

## 2023-10-19 RX ORDER — LISDEXAMFETAMINE DIMESYLATE CAPSULES 60 MG/1
60 CAPSULE ORAL EVERY MORNING
Qty: 30 CAPSULE | Refills: 0 | Status: SHIPPED | OUTPATIENT
Start: 2023-10-19

## 2023-10-19 RX ORDER — LISDEXAMFETAMINE DIMESYLATE CAPSULES 50 MG/1
50 CAPSULE ORAL EVERY MORNING
Qty: 30 CAPSULE | Refills: 0 | Status: SHIPPED | OUTPATIENT
Start: 2023-10-19

## 2023-10-19 NOTE — TELEPHONE ENCOUNTER
The prescription that was sent in today was accidentally sent to the wrong pharmacy. It was supposed to be sent to:     [facility] [id number] [address] - [phone]  - [phone] FX      Please advise.

## 2023-11-21 DIAGNOSIS — F90.0 ADHD (ATTENTION DEFICIT HYPERACTIVITY DISORDER), INATTENTIVE TYPE: ICD-10-CM

## 2023-11-21 RX ORDER — LISDEXAMFETAMINE DIMESYLATE CAPSULES 60 MG/1
60 CAPSULE ORAL EVERY MORNING
Qty: 30 CAPSULE | Refills: 0 | Status: SHIPPED | OUTPATIENT
Start: 2023-11-21 | End: 2023-11-21 | Stop reason: SDUPTHER

## 2023-11-22 DIAGNOSIS — F90.0 ADHD (ATTENTION DEFICIT HYPERACTIVITY DISORDER), INATTENTIVE TYPE: ICD-10-CM

## 2023-11-22 RX ORDER — LISDEXAMFETAMINE DIMESYLATE CAPSULES 60 MG/1
60 CAPSULE ORAL EVERY MORNING
Qty: 30 CAPSULE | Refills: 0 | Status: SHIPPED | OUTPATIENT
Start: 2023-11-22 | End: 2023-11-22 | Stop reason: SDUPTHER

## 2023-11-22 RX ORDER — LISDEXAMFETAMINE DIMESYLATE CAPSULES 60 MG/1
60 CAPSULE ORAL EVERY MORNING
Qty: 30 CAPSULE | Refills: 0 | Status: SHIPPED | OUTPATIENT
Start: 2023-11-22

## 2023-12-05 ENCOUNTER — OFFICE VISIT (OUTPATIENT)
Dept: FAMILY MEDICINE CLINIC | Facility: CLINIC | Age: 28
End: 2023-12-05
Payer: COMMERCIAL

## 2023-12-05 VITALS
BODY MASS INDEX: 30.45 KG/M2 | HEART RATE: 87 BPM | SYSTOLIC BLOOD PRESSURE: 110 MMHG | HEIGHT: 69 IN | WEIGHT: 205.6 LBS | TEMPERATURE: 97.5 F | DIASTOLIC BLOOD PRESSURE: 70 MMHG | OXYGEN SATURATION: 99 %

## 2023-12-05 DIAGNOSIS — E66.01 MORBID (SEVERE) OBESITY DUE TO EXCESS CALORIES: ICD-10-CM

## 2023-12-05 DIAGNOSIS — F90.0 ADHD (ATTENTION DEFICIT HYPERACTIVITY DISORDER), INATTENTIVE TYPE: Primary | ICD-10-CM

## 2023-12-05 RX ORDER — DOXYCYCLINE HYCLATE 100 MG/1
1 CAPSULE ORAL EVERY 12 HOURS SCHEDULED
COMMUNITY
Start: 2023-11-28

## 2023-12-05 RX ORDER — LISDEXAMFETAMINE DIMESYLATE CAPSULES 60 MG/1
60 CAPSULE ORAL EVERY MORNING
Qty: 30 CAPSULE | Refills: 0 | Status: SHIPPED | OUTPATIENT
Start: 2023-12-05

## 2023-12-05 NOTE — PROGRESS NOTES
"Chief Complaint  ADHD    Subjective        Nati Morin presents to [facility]  History of Present Illness  Patient is here for ADHD checkup needing refills has no chest pains, no palpitations no change in appetite.  And patient is staying focused with medication.  She is concerned about not being able to lose weight even though she exercises regularly.  She wants to see a dietician for this.    Objective   Vital Signs:  /70 (BP Location: Right arm, Patient Position: Sitting, Cuff Size: Adult)   Pulse 87   Temp 97.5 °F (36.4 °C)   Ht 175.3 cm (69.02\")   Wt 93.3 kg (205 lb 9.6 oz)   SpO2 99%   BMI 30.35 kg/m²   Estimated body mass index is 30.35 kg/m² as calculated from the following:    Height as of this encounter: 175.3 cm (69.02\").    Weight as of this encounter: 93.3 kg (205 lb 9.6 oz).               Physical Exam  Vitals and nursing note reviewed.   Constitutional:       Appearance: Normal appearance. She is well-developed.   Cardiovascular:      Rate and Rhythm: Normal rate and regular rhythm.      Heart sounds: Normal heart sounds. No murmur heard.  Pulmonary:      Effort: Pulmonary effort is normal. No respiratory distress.      Breath sounds: Normal breath sounds. No stridor. No wheezing or rhonchi.   Neurological:      General: No focal deficit present.      Mental Status: She is alert and oriented to person, place, and time. She is not disoriented.   Psychiatric:         Mood and Affect: Mood normal.         Behavior: Behavior normal.        Result Review :                   Assessment and Plan   Diagnoses and all orders for this visit:    1. ADHD (attention deficit hyperactivity disorder), inattentive type (Primary)  -     lisdexamfetamine (Vyvanse) 60 MG capsule; Take 1 capsule by mouth Every Morning  Dispense: 30 capsule; Refill: 0    2. Morbid (severe) obesity due to excess calories  -     Ambulatory Referral to Nutrition Services             Follow Up "   Return in about 3 months (around 3/5/2024).  Patient was given instructions and counseling regarding her condition or for health maintenance advice. Please see specific information pulled into the AVS if appropriate.       SHI RUN  and reviewed on Epic.  Risks of the medication include but are not limited to fatigue, somnolence, increased risk of falls, constipation, allergic reaction, dependence, and addiction.  Also, emphasized not taking any illicit substances.  Patient is aware and acknowledges information given.  Patient is functioning well with the medication.  Patient denies somnolence or any other side effects with the medication.   Medication refilled.      Refills and referral for dietician

## 2024-01-08 ENCOUNTER — HOSPITAL ENCOUNTER (OUTPATIENT)
Dept: DIABETES SERVICES | Facility: HOSPITAL | Age: 29
Discharge: HOME | End: 2024-01-08
Admitting: FAMILY MEDICINE
Payer: COMMERCIAL

## 2024-01-08 PROCEDURE — 97802 MEDICAL NUTRITION INDIV IN: CPT

## 2024-01-09 NOTE — PROGRESS NOTES
Diabetes Education    Patient Name:  Nati Morin  YOB: 1995  MRN: [id number]  Admit Date:  1/8/2024    Ms. Morin met with TORREY MIN for MNT.  A comprehensive assessment/training record has been sent to medical records (see media tab) to associate with this encounter.       Patient reports that she has been trying to lose weight, but hasn't had much success since having her child. Reports that she has been following a meal plan that her  gave her and is working out 3 times per week. Encouraged patient to hit at least 150 mins/week and getting at least 10,000 steps per day. We discussed meal planning and the importance of eating regular meals. We discussed importance of “balance meals/snacks”.  We discussed mindless/emotional eating. Patient verbalized understanding of all information reviewed at today’s visit.       Ms. Morin has been encouraged to call our office with questions or additional education needs. Please place referral for additional services or follow-up should need arise.     Thank you for the referral     Fina Montano RD, CHRISTIN, PAKO    Electronically signed by:  Fina Montano RD  01/09/24 09:04 EST

## 2024-01-19 DIAGNOSIS — F90.0 ADHD (ATTENTION DEFICIT HYPERACTIVITY DISORDER), INATTENTIVE TYPE: ICD-10-CM

## 2024-01-19 RX ORDER — LISDEXAMFETAMINE DIMESYLATE CAPSULES 60 MG/1
60 CAPSULE ORAL EVERY MORNING
Qty: 30 CAPSULE | Refills: 0 | Status: SHIPPED | OUTPATIENT
Start: 2024-01-19

## 2024-02-18 DIAGNOSIS — F90.0 ADHD (ATTENTION DEFICIT HYPERACTIVITY DISORDER), INATTENTIVE TYPE: ICD-10-CM

## 2024-02-18 RX ORDER — LISDEXAMFETAMINE DIMESYLATE CAPSULES 60 MG/1
60 CAPSULE ORAL EVERY MORNING
Qty: 30 CAPSULE | Refills: 0 | Status: SHIPPED | OUTPATIENT
Start: 2024-02-18

## 2024-02-26 DIAGNOSIS — F90.0 ADHD (ATTENTION DEFICIT HYPERACTIVITY DISORDER), INATTENTIVE TYPE: ICD-10-CM

## 2024-02-26 RX ORDER — LISDEXAMFETAMINE DIMESYLATE CAPSULES 60 MG/1
60 CAPSULE ORAL EVERY MORNING
Qty: 30 CAPSULE | Refills: 0 | Status: SHIPPED | OUTPATIENT
Start: 2024-02-26

## 2024-03-18 ENCOUNTER — OFFICE VISIT (OUTPATIENT)
Dept: FAMILY MEDICINE CLINIC | Facility: CLINIC | Age: 29
End: 2024-03-18
Payer: COMMERCIAL

## 2024-03-18 VITALS
HEIGHT: 69 IN | TEMPERATURE: 97.8 F | SYSTOLIC BLOOD PRESSURE: 120 MMHG | OXYGEN SATURATION: 100 % | HEART RATE: 79 BPM | BODY MASS INDEX: 28.88 KG/M2 | DIASTOLIC BLOOD PRESSURE: 78 MMHG | WEIGHT: 195 LBS

## 2024-03-18 DIAGNOSIS — F90.0 ADHD (ATTENTION DEFICIT HYPERACTIVITY DISORDER), INATTENTIVE TYPE: ICD-10-CM

## 2024-03-18 PROCEDURE — 99213 OFFICE O/P EST LOW 20 MIN: CPT | Performed by: FAMILY MEDICINE

## 2024-03-18 RX ORDER — LISDEXAMFETAMINE DIMESYLATE CAPSULES 60 MG/1
60 CAPSULE ORAL EVERY MORNING
Qty: 30 CAPSULE | Refills: 0 | Status: SHIPPED | OUTPATIENT
Start: 2024-03-18

## 2024-03-18 RX ORDER — TRIAMCINOLONE ACETONIDE 1 MG/G
1 CREAM TOPICAL 2 TIMES DAILY
COMMUNITY
Start: 2024-03-12

## 2024-03-18 NOTE — PROGRESS NOTES
"Chief Complaint  ADHD    Subjective        Nati Morin presents to [facility]  History of Present Illness  Patient is here for ADHD checkup needing refills has no chest pains, no palpitations no change in appetite.  And patient is staying focused with medication.    Objective   Vital Signs:  /78 (BP Location: Right arm, Patient Position: Sitting, Cuff Size: Large Adult)   Pulse 79   Temp 97.8 °F (36.6 °C) (Temporal)   Ht 175.3 cm (69.02\")   Wt 88.5 kg (195 lb)   SpO2 100%   BMI 28.78 kg/m²   Estimated body mass index is 28.78 kg/m² as calculated from the following:    Height as of this encounter: 175.3 cm (69.02\").    Weight as of this encounter: 88.5 kg (195 lb).               Physical Exam  Vitals and nursing note reviewed.   Constitutional:       Appearance: Normal appearance. She is well-developed.   Cardiovascular:      Rate and Rhythm: Normal rate and regular rhythm.      Heart sounds: Normal heart sounds. No murmur heard.  Pulmonary:      Effort: Pulmonary effort is normal. No respiratory distress.      Breath sounds: Normal breath sounds. No stridor. No wheezing or rhonchi.   Neurological:      General: No focal deficit present.      Mental Status: She is alert and oriented to person, place, and time. She is not disoriented.   Psychiatric:         Mood and Affect: Mood normal.         Behavior: Behavior normal.        Result Review :                     Assessment and Plan     Diagnoses and all orders for this visit:    1. ADHD (attention deficit hyperactivity disorder), inattentive type  -     lisdexamfetamine (Vyvanse) 60 MG capsule; Take 1 capsule by mouth Every Morning  Dispense: 30 capsule; Refill: 0             Follow Up     Return in about 3 months (around 6/18/2024) for adhd.  Patient was given instructions and counseling regarding her condition or for health maintenance advice. Please see specific information pulled into the AVS if appropriate. "     Refills and   SHI RUN  and reviewed on Epic.  Risks of the medication include but are not limited to fatigue, somnolence, increased risk of falls, constipation, allergic reaction, dependence, and addiction.  Also, emphasized not taking any illicit substances.  Patient is aware and acknowledges information given.  Patient is functioning well with the medication.  Patient denies somnolence or any other side effects with the medication.   Medication refilled.

## 2024-04-26 DIAGNOSIS — F90.0 ADHD (ATTENTION DEFICIT HYPERACTIVITY DISORDER), INATTENTIVE TYPE: ICD-10-CM

## 2024-04-29 RX ORDER — LISDEXAMFETAMINE DIMESYLATE 60 MG/1
60 CAPSULE ORAL EVERY MORNING
Qty: 30 CAPSULE | Refills: 0 | Status: SHIPPED | OUTPATIENT
Start: 2024-04-29

## 2024-05-30 DIAGNOSIS — F90.0 ADHD (ATTENTION DEFICIT HYPERACTIVITY DISORDER), INATTENTIVE TYPE: ICD-10-CM

## 2024-05-30 RX ORDER — LISDEXAMFETAMINE DIMESYLATE 60 MG/1
60 CAPSULE ORAL EVERY MORNING
Qty: 30 CAPSULE | Refills: 0 | Status: SHIPPED | OUTPATIENT
Start: 2024-05-30

## 2024-06-17 ENCOUNTER — OFFICE VISIT (OUTPATIENT)
Dept: FAMILY MEDICINE CLINIC | Facility: CLINIC | Age: 29
End: 2024-06-17
Payer: COMMERCIAL

## 2024-06-17 VITALS
HEART RATE: 91 BPM | OXYGEN SATURATION: 98 % | BODY MASS INDEX: 25.92 KG/M2 | TEMPERATURE: 97.6 F | RESPIRATION RATE: 18 BRPM | HEIGHT: 69 IN | SYSTOLIC BLOOD PRESSURE: 118 MMHG | WEIGHT: 175 LBS | DIASTOLIC BLOOD PRESSURE: 74 MMHG

## 2024-06-17 DIAGNOSIS — J30.1 ACUTE SEASONAL ALLERGIC RHINITIS DUE TO POLLEN: ICD-10-CM

## 2024-06-17 DIAGNOSIS — L98.9 SKIN LESION: ICD-10-CM

## 2024-06-17 DIAGNOSIS — F90.0 ADHD (ATTENTION DEFICIT HYPERACTIVITY DISORDER), INATTENTIVE TYPE: Primary | ICD-10-CM

## 2024-06-17 PROCEDURE — 99214 OFFICE O/P EST MOD 30 MIN: CPT | Performed by: FAMILY MEDICINE

## 2024-06-17 RX ORDER — LISDEXAMFETAMINE DIMESYLATE 60 MG/1
60 CAPSULE ORAL EVERY MORNING
Qty: 30 CAPSULE | Refills: 0 | Status: SHIPPED | OUTPATIENT
Start: 2024-06-17

## 2024-06-17 NOTE — PROGRESS NOTES
"Chief Complaint  ADD and Sinusitis (Thought she had a sinus infection, but isnt sure. Her ears are bothering her too)    Subjective        Nati Morin presents to [facility]  ADD    Sinusitis      Patient is here for ADHD checkup needing refills has no chest pains, no palpitations no change in appetite.  And patient is staying focused with medication.  Pt has been having a week long hx of sinus pain and she did mow lawn and thinks it is related to this.  She has no fever or chills.  Ears feel full.    Objective   Vital Signs:  /74 (BP Location: Left arm, Patient Position: Sitting, Cuff Size: Adult)   Pulse 91   Temp 97.6 °F (36.4 °C) (Tympanic)   Resp 18   Ht 175.3 cm (69.02\")   Wt 79.4 kg (175 lb)   SpO2 98%   BMI 25.83 kg/m²   Estimated body mass index is 25.83 kg/m² as calculated from the following:    Height as of this encounter: 175.3 cm (69.02\").    Weight as of this encounter: 79.4 kg (175 lb).               Physical Exam  Vitals and nursing note reviewed.   Constitutional:       Appearance: Normal appearance. She is well-developed.   Cardiovascular:      Rate and Rhythm: Normal rate and regular rhythm.      Heart sounds: Normal heart sounds. No murmur heard.  Pulmonary:      Effort: Pulmonary effort is normal. No respiratory distress.      Breath sounds: Normal breath sounds. No stridor. No wheezing or rhonchi.   Neurological:      General: No focal deficit present.      Mental Status: She is alert and oriented to person, place, and time. She is not disoriented.   Psychiatric:         Mood and Affect: Mood normal.         Behavior: Behavior normal.        Result Review :                     Assessment and Plan     Diagnoses and all orders for this visit:    1. ADHD (attention deficit hyperactivity disorder), inattentive type (Primary)  -     lisdexamfetamine (Vyvanse) 60 MG capsule; Take 1 capsule by mouth Every Morning  Dispense: 30 capsule; Refill: 0    2. " Skin lesion  -     Ambulatory Referral to Dermatology    3. Acute seasonal allergic rhinitis due to pollen             Follow Up     No follow-ups on file.  Patient was given instructions and counseling regarding her condition or for health maintenance advice. Please see specific information pulled into the AVS if appropriate.     Refills,   SHI RUN  and reviewed on Epic.  Risks of the medication include but are not limited to fatigue, somnolence, increased risk of falls, constipation, allergic reaction, dependence, and addiction.  Also, emphasized not taking any illicit substances.  Patient is aware and acknowledges information given.  Patient is functioning well with the medication.  Patient denies somnolence or any other side effects with the medication.   Medication refilled.    Her sx are related to allergies and will continue to monitor and if no better, she will let me know.

## 2024-06-19 DIAGNOSIS — F90.0 ADHD (ATTENTION DEFICIT HYPERACTIVITY DISORDER), INATTENTIVE TYPE: ICD-10-CM

## 2024-06-19 RX ORDER — LISDEXAMFETAMINE DIMESYLATE 60 MG/1
60 CAPSULE ORAL EVERY MORNING
Qty: 30 CAPSULE | Refills: 0 | OUTPATIENT
Start: 2024-06-19

## 2024-07-05 ENCOUNTER — TELEPHONE (OUTPATIENT)
Dept: FAMILY MEDICINE CLINIC | Facility: CLINIC | Age: 29
End: 2024-07-05
Payer: COMMERCIAL

## 2024-07-05 DIAGNOSIS — F90.0 ADHD (ATTENTION DEFICIT HYPERACTIVITY DISORDER), INATTENTIVE TYPE: ICD-10-CM

## 2024-07-05 RX ORDER — LISDEXAMFETAMINE DIMESYLATE 60 MG/1
60 CAPSULE ORAL EVERY MORNING
Qty: 30 CAPSULE | Refills: 0 | Status: SHIPPED | OUTPATIENT
Start: 2024-07-05

## 2024-07-05 NOTE — TELEPHONE ENCOUNTER
PATIENT CALLED AND STATES THE MEDICATION     lisdexamfetamine (Vyvanse) 60 MG capsule     WAS SENT TO WRONG PHARMACY ON 6/17/24  SHE IS OUT OF MEDICATION    PLEASE SEND TO     [facility] [id number] - [place], [place] - [address] [place] [address] [address] - [phone]  - [phone]     CALL BACK NUMBER [phone]

## 2024-08-07 DIAGNOSIS — F90.0 ADHD (ATTENTION DEFICIT HYPERACTIVITY DISORDER), INATTENTIVE TYPE: ICD-10-CM

## 2024-08-07 RX ORDER — LISDEXAMFETAMINE DIMESYLATE 60 MG/1
60 CAPSULE ORAL EVERY MORNING
Qty: 30 CAPSULE | Refills: 0 | Status: SHIPPED | OUTPATIENT
Start: 2024-08-07

## 2024-08-20 ENCOUNTER — OFFICE VISIT (OUTPATIENT)
Dept: FAMILY MEDICINE CLINIC | Facility: CLINIC | Age: 29
End: 2024-08-20
Payer: COMMERCIAL

## 2024-08-20 VITALS
SYSTOLIC BLOOD PRESSURE: 112 MMHG | HEART RATE: 66 BPM | HEIGHT: 69 IN | DIASTOLIC BLOOD PRESSURE: 78 MMHG | TEMPERATURE: 98.2 F | WEIGHT: 164 LBS | OXYGEN SATURATION: 100 % | BODY MASS INDEX: 24.29 KG/M2

## 2024-08-20 DIAGNOSIS — J01.00 ACUTE NON-RECURRENT MAXILLARY SINUSITIS: Primary | ICD-10-CM

## 2024-08-20 PROCEDURE — 99213 OFFICE O/P EST LOW 20 MIN: CPT | Performed by: FAMILY MEDICINE

## 2024-08-20 RX ORDER — AMOXICILLIN 875 MG/1
875 TABLET, COATED ORAL 2 TIMES DAILY
Qty: 20 TABLET | Refills: 0 | Status: SHIPPED | OUTPATIENT
Start: 2024-08-20

## 2024-08-20 NOTE — PROGRESS NOTES
"Chief Complaint  Sinusitis (Pressure in face - yellow drainage - only has a cough when she lays down x 3 days--/Taken 3 covid tests that were negative)    Subjective        Nati Morin presents to [facility]  Sinusitis      Pt presents today with a 3 day hx of cough, congestion, sinus pain and no fever or chills.  She has had 3 negative covid tests.  No SOB.   Objective   Vital Signs:  /78   Pulse 66   Temp 98.2 °F (36.8 °C) (Infrared)   Ht 175.3 cm (69\")   Wt 74.4 kg (164 lb)   SpO2 100%   BMI 24.22 kg/m²   Estimated body mass index is 24.22 kg/m² as calculated from the following:    Height as of this encounter: 175.3 cm (69\").    Weight as of this encounter: 74.4 kg (164 lb).    BMI is within normal parameters. No other follow-up for BMI required.      Physical Exam  Vitals and nursing note reviewed.   Constitutional:       Appearance: Normal appearance. She is well-developed. She is not ill-appearing.   HENT:      Head: Normocephalic and atraumatic.      Comments: Sinus pain     Nose: Nose normal. No congestion.      Mouth/Throat:      Mouth: Mucous membranes are moist.      Pharynx: Oropharynx is clear. No oropharyngeal exudate or posterior oropharyngeal erythema.   Eyes:      General: No scleral icterus.        Right eye: No discharge.         Left eye: No discharge.      Extraocular Movements: Extraocular movements intact.      Conjunctiva/sclera: Conjunctivae normal.      Pupils: Pupils are equal, round, and reactive to light.   Cardiovascular:      Rate and Rhythm: Normal rate and regular rhythm.      Heart sounds: Normal heart sounds. No murmur heard.  Pulmonary:      Effort: Pulmonary effort is normal. No respiratory distress.      Breath sounds: Normal breath sounds. No stridor. No wheezing or rhonchi.   Musculoskeletal:      Cervical back: Normal range of motion and neck supple. No rigidity or tenderness.   Lymphadenopathy:      Cervical: No cervical " adenopathy.   Neurological:      General: No focal deficit present.      Mental Status: She is alert and oriented to person, place, and time. She is not disoriented.   Psychiatric:         Mood and Affect: Mood normal.         Behavior: Behavior normal.        Result Review :                Assessment and Plan   Diagnoses and all orders for this visit:    1. Acute non-recurrent maxillary sinusitis (Primary)  -     amoxicillin (AMOXIL) 875 MG tablet; Take 1 tablet by mouth 2 (Two) Times a Day.  Dispense: 20 tablet; Refill: 0             Follow Up   No follow-ups on file.  Patient was given instructions and counseling regarding her condition or for health maintenance advice. Please see specific information pulled into the AVS if appropriate.       Symptomatic treatment and otc meds and fluids and rest.  Follow up if no better.    Start abx.

## 2024-09-09 DIAGNOSIS — F90.0 ADHD (ATTENTION DEFICIT HYPERACTIVITY DISORDER), INATTENTIVE TYPE: ICD-10-CM

## 2024-09-09 RX ORDER — LISDEXAMFETAMINE DIMESYLATE 60 MG/1
60 CAPSULE ORAL EVERY MORNING
Qty: 30 CAPSULE | Refills: 0 | Status: SHIPPED | OUTPATIENT
Start: 2024-09-09

## 2024-10-08 DIAGNOSIS — F90.0 ADHD (ATTENTION DEFICIT HYPERACTIVITY DISORDER), INATTENTIVE TYPE: ICD-10-CM

## 2024-10-08 RX ORDER — LISDEXAMFETAMINE DIMESYLATE 60 MG/1
60 CAPSULE ORAL EVERY MORNING
Qty: 30 CAPSULE | Refills: 0 | Status: SHIPPED | OUTPATIENT
Start: 2024-10-08

## 2024-10-08 NOTE — TELEPHONE ENCOUNTER
LOV                   8/20/2024  NOV                   na  Last refill             9/9/24  Protocol           not met     Please advise

## 2024-11-08 ENCOUNTER — OFFICE VISIT (OUTPATIENT)
Dept: FAMILY MEDICINE CLINIC | Facility: CLINIC | Age: 29
End: 2024-11-08
Payer: COMMERCIAL

## 2024-11-08 VITALS
TEMPERATURE: 97.9 F | BODY MASS INDEX: 22.39 KG/M2 | HEART RATE: 100 BPM | HEIGHT: 69 IN | SYSTOLIC BLOOD PRESSURE: 116 MMHG | DIASTOLIC BLOOD PRESSURE: 72 MMHG | WEIGHT: 151.2 LBS | OXYGEN SATURATION: 98 % | RESPIRATION RATE: 16 BRPM

## 2024-11-08 DIAGNOSIS — Z23 NEED FOR IMMUNIZATION AGAINST INFLUENZA: ICD-10-CM

## 2024-11-08 DIAGNOSIS — F90.0 ADHD (ATTENTION DEFICIT HYPERACTIVITY DISORDER), INATTENTIVE TYPE: ICD-10-CM

## 2024-11-08 DIAGNOSIS — Z79.899 HIGH RISK MEDICATION USE: Primary | ICD-10-CM

## 2024-11-08 RX ORDER — LISDEXAMFETAMINE DIMESYLATE 60 MG/1
60 CAPSULE ORAL EVERY MORNING
Qty: 30 CAPSULE | Refills: 0 | Status: SHIPPED | OUTPATIENT
Start: 2024-11-08

## 2024-11-08 NOTE — PROGRESS NOTES
"Chief Complaint  ADHD    Subjective        Nati Morin presents to [facility]  History of Present Illness  Patient is here for ADHD checkup needing refills has no chest pains, no palpitations no change in appetite.  And patient is staying focused with medication.    Objective   Vital Signs:  /72 (BP Location: Right arm, Patient Position: Sitting)   Pulse 100   Temp 97.9 °F (36.6 °C)   Resp 16   Ht 175.3 cm (69\")   Wt 68.6 kg (151 lb 3.2 oz)   SpO2 98%   BMI 22.33 kg/m²   Estimated body mass index is 22.33 kg/m² as calculated from the following:    Height as of this encounter: 175.3 cm (69\").    Weight as of this encounter: 68.6 kg (151 lb 3.2 oz).    BMI is within normal parameters. No other follow-up for BMI required.      Physical Exam  Vitals and nursing note reviewed.   Constitutional:       Appearance: Normal appearance. She is well-developed.   Cardiovascular:      Rate and Rhythm: Normal rate and regular rhythm.      Heart sounds: Normal heart sounds. No murmur heard.  Pulmonary:      Effort: Pulmonary effort is normal. No respiratory distress.      Breath sounds: Normal breath sounds. No stridor. No wheezing or rhonchi.   Neurological:      General: No focal deficit present.      Mental Status: She is alert and oriented to person, place, and time. She is not disoriented.   Psychiatric:         Mood and Affect: Mood normal.         Behavior: Behavior normal.        Result Review :                Assessment and Plan   Diagnoses and all orders for this visit:    1. High risk medication use (Primary)  -     Compliance Drug Analysis, Ur - Urine, Clean Catch    2. ADHD (attention deficit hyperactivity disorder), inattentive type  -     lisdexamfetamine (Vyvanse) 60 MG capsule; Take 1 capsule by mouth Every Morning  Dispense: 30 capsule; Refill: 0  -     Compliance Drug Analysis, Ur - Urine, Clean Catch    3. Need for immunization against influenza  -     Fluzone >6mos " ([id number])             Follow Up   No follow-ups on file.  Patient was given instructions and counseling regarding her condition or for health maintenance advice. Please see specific information pulled into the AVS if appropriate.         SHI RUN  and reviewed on Epic.  Risks of the medication include but are not limited to fatigue, somnolence, increased risk of falls, constipation, allergic reaction, dependence, and addiction.  Also, emphasized not taking any illicit substances.  Patient is aware and acknowledges information given.  Patient is functioning well with the medication.  Patient denies somnolence or any other side effects with the medication.   Medication refilled.      Refills and drug screen.

## 2024-11-14 DIAGNOSIS — F90.0 ADHD (ATTENTION DEFICIT HYPERACTIVITY DISORDER), INATTENTIVE TYPE: ICD-10-CM

## 2024-11-14 RX ORDER — LISDEXAMFETAMINE DIMESYLATE 60 MG/1
60 CAPSULE ORAL EVERY MORNING
Qty: 30 CAPSULE | Refills: 0 | Status: SHIPPED | OUTPATIENT
Start: 2024-11-14

## 2024-11-14 NOTE — TELEPHONE ENCOUNTER
LOV                  11/8/2024                    NOV                  Visit date not found  LAST REFILL     11/8/2024  PROTOCOL       Met

## 2024-11-14 NOTE — TELEPHONE ENCOUNTER
"    Caller: Nati Morin \"Andrea\"    Relationship: Self    Best call back number: [phone]      Requested Prescriptions:   Requested Prescriptions     Pending Prescriptions Disp Refills    lisdexamfetamine (Vyvanse) 60 MG capsule 30 capsule 0     Sig: Take 1 capsule by mouth Every Morning        Pharmacy where request should be sent: [facility] [id number] - [place], [place] - [address] AT [place] [address] - [phone] [facility] [phone]      Last office visit with prescribing clinician: 11/8/2024   Last telemedicine visit with prescribing clinician: Visit date not found   Next office visit with prescribing clinician: Visit date not found     Additional details provided by patient:   PATIENT HAS BEEN OUT OF MEDICATION FOR 4 DAYS AND HER NORMAL PHARMACY IS OUT OF THIS MEDICATION.     Does the patient have less than a 3 day supply:  [x] Yes  [] No    Would you like a call back once the refill request has been completed: [] Yes [x] No    If the office needs to give you a call back, can they leave a voicemail: [] Yes [x] No    Jana Woods Rep   11/14/24 12:57 EST         "

## 2024-11-16 LAB — DRUGS UR: NORMAL

## 2024-12-09 ENCOUNTER — HOSPITAL ENCOUNTER (EMERGENCY)
Facility: HOSPITAL | Age: 29
Discharge: HOME | End: 2024-12-09
Attending: EMERGENCY MEDICINE | Admitting: EMERGENCY MEDICINE
Payer: COMMERCIAL

## 2024-12-09 VITALS
BODY MASS INDEX: 21.02 KG/M2 | HEART RATE: 80 BPM | SYSTOLIC BLOOD PRESSURE: 128 MMHG | WEIGHT: 141.91 LBS | TEMPERATURE: 98 F | HEIGHT: 69 IN | RESPIRATION RATE: 16 BRPM | OXYGEN SATURATION: 100 % | DIASTOLIC BLOOD PRESSURE: 76 MMHG

## 2024-12-09 DIAGNOSIS — A08.4 VIRAL GASTROENTERITIS: Primary | ICD-10-CM

## 2024-12-09 DIAGNOSIS — R19.7 WATERY DIARRHEA: ICD-10-CM

## 2024-12-09 LAB
ALBUMIN SERPL-MCNC: 4.7 G/DL (ref 3.5–5.2)
ALBUMIN/GLOB SERPL: 1.7 G/DL
ALP SERPL-CCNC: 54 U/L (ref 39–117)
ALT SERPL W P-5'-P-CCNC: 7 U/L (ref 1–33)
ANION GAP SERPL CALCULATED.3IONS-SCNC: 10.1 MMOL/L (ref 5–15)
AST SERPL-CCNC: 11 U/L (ref 1–32)
BACTERIA UR QL AUTO: ABNORMAL /HPF
BASOPHILS # BLD AUTO: 0.02 10*3/MM3 (ref 0–0.2)
BASOPHILS NFR BLD AUTO: 0.3 % (ref 0–1.5)
BILIRUB SERPL-MCNC: 0.7 MG/DL (ref 0–1.2)
BILIRUB UR QL STRIP: ABNORMAL
BUN SERPL-MCNC: 20 MG/DL (ref 6–20)
BUN/CREAT SERPL: 23.5 (ref 7–25)
CALCIUM SPEC-SCNC: 9.6 MG/DL (ref 8.6–10.5)
CHLORIDE SERPL-SCNC: 105 MMOL/L (ref 98–107)
CLARITY UR: ABNORMAL
CO2 SERPL-SCNC: 23.9 MMOL/L (ref 22–29)
COLOR UR: YELLOW
CREAT SERPL-MCNC: 0.85 MG/DL (ref 0.57–1)
DEPRECATED RDW RBC AUTO: 44.6 FL (ref 37–54)
EGFRCR SERPLBLD CKD-EPI 2021: 94.7 ML/MIN/1.73
EOSINOPHIL # BLD AUTO: 0.18 10*3/MM3 (ref 0–0.4)
EOSINOPHIL NFR BLD AUTO: 2.4 % (ref 0.3–6.2)
ERYTHROCYTE [DISTWIDTH] IN BLOOD BY AUTOMATED COUNT: 12.9 % (ref 12.3–15.4)
FLUAV SUBTYP SPEC NAA+PROBE: NOT DETECTED
FLUBV RNA ISLT QL NAA+PROBE: NOT DETECTED
GLOBULIN UR ELPH-MCNC: 2.7 GM/DL
GLUCOSE SERPL-MCNC: 103 MG/DL (ref 65–99)
GLUCOSE UR STRIP-MCNC: NEGATIVE MG/DL
HCT VFR BLD AUTO: 40.6 % (ref 34–46.6)
HGB BLD-MCNC: 13.2 G/DL (ref 12–15.9)
HGB UR QL STRIP.AUTO: NEGATIVE
HYALINE CASTS UR QL AUTO: ABNORMAL /LPF
IMM GRANULOCYTES # BLD AUTO: 0.01 10*3/MM3 (ref 0–0.05)
IMM GRANULOCYTES NFR BLD AUTO: 0.1 % (ref 0–0.5)
KETONES UR QL STRIP: ABNORMAL
LEUKOCYTE ESTERASE UR QL STRIP.AUTO: NEGATIVE
LIPASE SERPL-CCNC: 15 U/L (ref 13–60)
LYMPHOCYTES # BLD AUTO: 1.07 10*3/MM3 (ref 0.7–3.1)
LYMPHOCYTES NFR BLD AUTO: 14 % (ref 19.6–45.3)
MCH RBC QN AUTO: 30.1 PG (ref 26.6–33)
MCHC RBC AUTO-ENTMCNC: 32.5 G/DL (ref 31.5–35.7)
MCV RBC AUTO: 92.5 FL (ref 79–97)
MONOCYTES # BLD AUTO: 0.49 10*3/MM3 (ref 0.1–0.9)
MONOCYTES NFR BLD AUTO: 6.4 % (ref 5–12)
MUCOUS THREADS URNS QL MICRO: ABNORMAL /HPF
NEUTROPHILS NFR BLD AUTO: 5.86 10*3/MM3 (ref 1.7–7)
NEUTROPHILS NFR BLD AUTO: 76.8 % (ref 42.7–76)
NITRITE UR QL STRIP: NEGATIVE
PH UR STRIP.AUTO: 6 [PH] (ref 5–8)
PLATELET # BLD AUTO: 225 10*3/MM3 (ref 140–450)
PMV BLD AUTO: 10.7 FL (ref 6–12)
POTASSIUM SERPL-SCNC: 4.2 MMOL/L (ref 3.5–5.2)
PROT SERPL-MCNC: 7.4 G/DL (ref 6–8.5)
PROT UR QL STRIP: ABNORMAL
RBC # BLD AUTO: 4.39 10*6/MM3 (ref 3.77–5.28)
RBC # UR STRIP: ABNORMAL /HPF
REF LAB TEST METHOD: ABNORMAL
SARS-COV-2 RNA RESP QL NAA+PROBE: NOT DETECTED
SODIUM SERPL-SCNC: 139 MMOL/L (ref 136–145)
SP GR UR STRIP: >=1.03 (ref 1–1.03)
SQUAMOUS #/AREA URNS HPF: ABNORMAL /HPF
UROBILINOGEN UR QL STRIP: ABNORMAL
WBC # UR STRIP: ABNORMAL /HPF
WBC NRBC COR # BLD AUTO: 7.63 10*3/MM3 (ref 3.4–10.8)

## 2024-12-09 PROCEDURE — 25010000002 ONDANSETRON PER 1 MG

## 2024-12-09 PROCEDURE — 87636 SARSCOV2 & INF A&B AMP PRB: CPT | Performed by: EMERGENCY MEDICINE

## 2024-12-09 PROCEDURE — 85025 COMPLETE CBC W/AUTO DIFF WBC: CPT

## 2024-12-09 PROCEDURE — 96374 THER/PROPH/DIAG INJ IV PUSH: CPT

## 2024-12-09 PROCEDURE — 99283 EMERGENCY DEPT VISIT LOW MDM: CPT

## 2024-12-09 PROCEDURE — 81001 URINALYSIS AUTO W/SCOPE: CPT

## 2024-12-09 PROCEDURE — 25810000003 SODIUM CHLORIDE 0.9 % SOLUTION

## 2024-12-09 PROCEDURE — 83690 ASSAY OF LIPASE: CPT

## 2024-12-09 PROCEDURE — 80053 COMPREHEN METABOLIC PANEL: CPT

## 2024-12-09 RX ORDER — ONDANSETRON 2 MG/ML
4 INJECTION INTRAMUSCULAR; INTRAVENOUS ONCE
Status: COMPLETED | OUTPATIENT
Start: 2024-12-09 | End: 2024-12-09

## 2024-12-09 RX ORDER — ONDANSETRON 4 MG/1
4 TABLET, ORALLY DISINTEGRATING ORAL EVERY 8 HOURS PRN
Qty: 21 TABLET | Refills: 0 | Status: SHIPPED | OUTPATIENT
Start: 2024-12-09 | End: 2024-12-16

## 2024-12-09 RX ORDER — SODIUM CHLORIDE 0.9 % (FLUSH) 0.9 %
10 SYRINGE (ML) INJECTION AS NEEDED
Status: DISCONTINUED | OUTPATIENT
Start: 2024-12-09 | End: 2024-12-09 | Stop reason: HOSPADM

## 2024-12-09 RX ADMIN — SODIUM CHLORIDE 500 ML: 9 INJECTION, SOLUTION INTRAVENOUS at 11:55

## 2024-12-09 RX ADMIN — ONDANSETRON 4 MG: 2 INJECTION, SOLUTION INTRAMUSCULAR; INTRAVENOUS at 11:55

## 2024-12-09 NOTE — FSED PROVIDER NOTE
Subjective   History of Present Illness  29-year-old female reports 3 to 4-day history of vomiting and diarrhea she reports that she has been taking Imodium and that her diarrhea slowed somewhat.  She reports that if she tries to drink liquids or eat anything solid she becomes nauseated and vomits.  She does not have Zofran at home.  Reports taking semaglutide but is trying to wean herself off this medication.  Denies that she is diabetic denies chest pain shortness of breath denies the presence of blood in her urine stool or emesis denying fever.        Review of Systems   All other systems reviewed and are negative.      Past Medical History:   Diagnosis Date    Acute seasonal allergic rhinitis due to pollen     Anxiety and depression     Hematuria     Herpes zoster     High risk medication use        No Known Allergies    History reviewed. No pertinent surgical history.    Family History   Problem Relation Age of Onset    Depression Mother     Alcohol abuse Father        Social History     Socioeconomic History    Marital status: Single   Tobacco Use    Smoking status: Never    Smokeless tobacco: Never   Vaping Use    Vaping status: Never Used   Substance and Sexual Activity    Alcohol use: Yes     Comment: social use    Drug use: No    Sexual activity: Defer           Objective   Physical Exam  Vitals and nursing note reviewed.   Constitutional:       General: She is not in acute distress.     Appearance: Normal appearance. She is normal weight. She is not toxic-appearing.   HENT:      Head: Normocephalic and atraumatic.      Nose: Nose normal.      Mouth/Throat:      Mouth: Mucous membranes are moist. Mucous membranes are dry.      Pharynx: Oropharynx is clear.   Eyes:      Extraocular Movements: Extraocular movements intact.      Conjunctiva/sclera: Conjunctivae normal.      Pupils: Pupils are equal, round, and reactive to light.   Cardiovascular:      Rate and Rhythm: Normal rate.      Pulses: Normal pulses.       Heart sounds: Normal heart sounds.   Pulmonary:      Effort: Pulmonary effort is normal. No respiratory distress.      Breath sounds: Normal breath sounds.   Abdominal:      General: Abdomen is flat. Bowel sounds are normal.      Palpations: Abdomen is soft. There is no mass.      Tenderness: There is no abdominal tenderness. There is no right CVA tenderness, left CVA tenderness or guarding.   Musculoskeletal:         General: Normal range of motion.      Cervical back: Normal range of motion.   Skin:     General: Skin is warm.      Capillary Refill: Capillary refill takes less than 2 seconds.   Neurological:      General: No focal deficit present.      Mental Status: She is alert and oriented to person, place, and time. Mental status is at baseline.         Procedures           ED Course  ED Course as of 12/09/24 1323   Mon Dec 09, 2024   1214 CBC is negative for leukocytosis    UA is negative for nitrite leukocytes and blood moderate amount of bilirubin noted    COVID influenza not detected [WF]   1238 CMP is unremarkable    Lipase normal at 15 [WF]      ED Course User Index  [WF] Elgin Sunshine Jr., APRN                                           Medical Decision Making  Differential diagnosis would include viral gastroenteritis, cholecystitis and pancreatitis    I have ordered abdominal pain workup as patient's heart rate is elevated at 122 she has dry mucous membranes will give 500 cc saline fluid bolus along with Zofran.    HR down to 78    Patient has not had any active vomiting here in ER she has had 1 loose stool.  CBC CMP urinalysis COVID influenza all unremarkable    Patient is not having any abdominal pain.  Discussed discharging home with Zofran recommending brat diet patient is agreeable plan discharge    Problems Addressed:  Viral gastroenteritis: complicated acute illness or injury  Watery diarrhea: complicated acute illness or injury    Amount and/or Complexity of Data Reviewed  Labs:  ordered.    Risk  Prescription drug management.        Final diagnoses:   Viral gastroenteritis   Watery diarrhea       ED Disposition  ED Disposition       ED Disposition   Discharge    Condition   Stable    Comment   Patient given discharge instructions and verbalized understanding. Patient discharged home.               Valerie Delgado MD  [address] [place] [address]  [phone]               Medication List        New Prescriptions      ondansetron ODT 4 MG disintegrating tablet  Commonly known as: ZOFRAN-ODT  Place 1 tablet on the tongue Every 8 (Eight) Hours As Needed for Vomiting for up to 7 days.               Where to Get Your Medications        These medications were sent to [facility] [id number] - [place], [place] - [address] [facility] - [phone]  - [phone] [facility] [facility] [place] [address]      Phone: [phone]   ondansetron ODT 4 MG disintegrating tablet

## 2024-12-09 NOTE — DISCHARGE INSTRUCTIONS
Alternate Tylenol and ibuprofen every 4-6 hours for fever and body ache    Take Zofran as needed for nausea recommend clear liquids ginger ale Sprite Pedialyte    Brat diet may be helpful for diarrhea management recommend bananas rice applesauce toast    Follow-up with your family doctor as needed return to ER for worsening symptoms

## 2024-12-16 DIAGNOSIS — F90.0 ADHD (ATTENTION DEFICIT HYPERACTIVITY DISORDER), INATTENTIVE TYPE: ICD-10-CM

## 2024-12-16 RX ORDER — LISDEXAMFETAMINE DIMESYLATE 60 MG/1
60 CAPSULE ORAL EVERY MORNING
Qty: 30 CAPSULE | Refills: 0 | Status: SHIPPED | OUTPATIENT
Start: 2024-12-16

## 2024-12-16 NOTE — TELEPHONE ENCOUNTER
LOV                  11/8/2024                    NOV                LAST REFILL     11/14/2024  PROTOCOL       Met

## 2024-12-17 DIAGNOSIS — F90.0 ADHD (ATTENTION DEFICIT HYPERACTIVITY DISORDER), INATTENTIVE TYPE: ICD-10-CM

## 2024-12-17 RX ORDER — LISDEXAMFETAMINE DIMESYLATE 60 MG/1
60 CAPSULE ORAL EVERY MORNING
Qty: 30 CAPSULE | Refills: 0 | OUTPATIENT
Start: 2024-12-17

## 2024-12-17 NOTE — TELEPHONE ENCOUNTER
LOV                  11/8/2024                    NOV                  Visit date not found  LAST REFILL     yesterday   PROTOCOL       met

## 2025-01-20 DIAGNOSIS — F90.0 ADHD (ATTENTION DEFICIT HYPERACTIVITY DISORDER), INATTENTIVE TYPE: ICD-10-CM

## 2025-01-20 NOTE — TELEPHONE ENCOUNTER
LOV                  11/8/2024                    NOV                  due 2/8/2025  LAST REFILL     12/16/2024  PROTOCOL       Not Met

## 2025-01-21 RX ORDER — LISDEXAMFETAMINE DIMESYLATE 60 MG/1
60 CAPSULE ORAL EVERY MORNING
Qty: 30 CAPSULE | Refills: 0 | Status: SHIPPED | OUTPATIENT
Start: 2025-01-21

## 2025-02-24 DIAGNOSIS — F90.0 ADHD (ATTENTION DEFICIT HYPERACTIVITY DISORDER), INATTENTIVE TYPE: ICD-10-CM

## 2025-02-24 RX ORDER — LISDEXAMFETAMINE DIMESYLATE 60 MG/1
60 CAPSULE ORAL EVERY MORNING
Qty: 30 CAPSULE | Refills: 0 | Status: SHIPPED | OUTPATIENT
Start: 2025-02-24